# Patient Record
Sex: MALE | Race: WHITE | Employment: FULL TIME | URBAN - METROPOLITAN AREA
[De-identification: names, ages, dates, MRNs, and addresses within clinical notes are randomized per-mention and may not be internally consistent; named-entity substitution may affect disease eponyms.]

---

## 2017-01-06 ENCOUNTER — APPOINTMENT (OUTPATIENT)
Dept: LAB | Facility: HOSPITAL | Age: 42
End: 2017-01-06
Attending: INTERNAL MEDICINE
Payer: COMMERCIAL

## 2017-01-06 ENCOUNTER — OFFICE VISIT (OUTPATIENT)
Dept: INTERNAL MEDICINE CLINIC | Facility: CLINIC | Age: 42
End: 2017-01-06

## 2017-01-06 VITALS
SYSTOLIC BLOOD PRESSURE: 128 MMHG | BODY MASS INDEX: 29.68 KG/M2 | WEIGHT: 212 LBS | HEIGHT: 71 IN | DIASTOLIC BLOOD PRESSURE: 88 MMHG | HEART RATE: 92 BPM

## 2017-01-06 DIAGNOSIS — R19.7 DIARRHEA, UNSPECIFIED TYPE: ICD-10-CM

## 2017-01-06 DIAGNOSIS — Z00.00 ANNUAL PHYSICAL EXAM: ICD-10-CM

## 2017-01-06 DIAGNOSIS — Z00.00 ANNUAL PHYSICAL EXAM: Primary | ICD-10-CM

## 2017-01-06 PROBLEM — E78.5 DYSLIPIDEMIA: Status: ACTIVE | Noted: 2017-01-06

## 2017-01-06 LAB
ALBUMIN SERPL BCP-MCNC: 4.3 G/DL (ref 3.5–4.8)
ALBUMIN/GLOB SERPL: 1.4 {RATIO} (ref 1–2)
ALP SERPL-CCNC: 57 U/L (ref 32–100)
ALT SERPL-CCNC: 36 U/L (ref 17–63)
ANION GAP SERPL CALC-SCNC: 7 MMOL/L (ref 0–18)
AST SERPL-CCNC: 27 U/L (ref 15–41)
BILIRUB SERPL-MCNC: 1.5 MG/DL (ref 0.3–1.2)
BUN SERPL-MCNC: 10 MG/DL (ref 8–20)
BUN/CREAT SERPL: 11.6 (ref 10–20)
CALCIUM SERPL-MCNC: 9.1 MG/DL (ref 8.5–10.5)
CHLORIDE SERPL-SCNC: 106 MMOL/L (ref 95–110)
CHOLEST SERPL-MCNC: 185 MG/DL (ref 110–200)
CO2 SERPL-SCNC: 28 MMOL/L (ref 22–32)
CREAT SERPL-MCNC: 0.86 MG/DL (ref 0.5–1.5)
ERYTHROCYTE [DISTWIDTH] IN BLOOD BY AUTOMATED COUNT: 13.3 % (ref 11–15)
GLOBULIN PLAS-MCNC: 3 G/DL (ref 2.5–3.7)
GLUCOSE SERPL-MCNC: 92 MG/DL (ref 70–99)
HCT VFR BLD AUTO: 47.7 % (ref 41–52)
HDLC SERPL-MCNC: 38 MG/DL
HGB BLD-MCNC: 15.7 G/DL (ref 13.5–17.5)
LDLC SERPL CALC-MCNC: 128 MG/DL (ref 0–99)
MCH RBC QN AUTO: 29.4 PG (ref 27–32)
MCHC RBC AUTO-ENTMCNC: 32.9 G/DL (ref 32–37)
MCV RBC AUTO: 89.6 FL (ref 80–100)
NONHDLC SERPL-MCNC: 147 MG/DL
OSMOLALITY UR CALC.SUM OF ELEC: 291 MOSM/KG (ref 275–295)
PLATELET # BLD AUTO: 194 K/UL (ref 140–400)
PMV BLD AUTO: 10.6 FL (ref 7.4–10.3)
POTASSIUM SERPL-SCNC: 4.1 MMOL/L (ref 3.3–5.1)
PROT SERPL-MCNC: 7.3 G/DL (ref 5.9–8.4)
RBC # BLD AUTO: 5.32 M/UL (ref 4.5–5.9)
SODIUM SERPL-SCNC: 141 MMOL/L (ref 136–144)
TRIGL SERPL-MCNC: 97 MG/DL (ref 1–149)
TSH SERPL-ACNC: 1.59 UIU/ML (ref 0.34–5.6)
WBC # BLD AUTO: 7.2 K/UL (ref 4–11)

## 2017-01-06 PROCEDURE — 36415 COLL VENOUS BLD VENIPUNCTURE: CPT

## 2017-01-06 PROCEDURE — 99386 PREV VISIT NEW AGE 40-64: CPT | Performed by: INTERNAL MEDICINE

## 2017-01-06 PROCEDURE — 90471 IMMUNIZATION ADMIN: CPT | Performed by: INTERNAL MEDICINE

## 2017-01-06 PROCEDURE — 84443 ASSAY THYROID STIM HORMONE: CPT

## 2017-01-06 PROCEDURE — 80053 COMPREHEN METABOLIC PANEL: CPT

## 2017-01-06 PROCEDURE — 85027 COMPLETE CBC AUTOMATED: CPT

## 2017-01-06 PROCEDURE — 80061 LIPID PANEL: CPT

## 2017-01-06 PROCEDURE — 90715 TDAP VACCINE 7 YRS/> IM: CPT | Performed by: INTERNAL MEDICINE

## 2017-01-06 NOTE — PATIENT INSTRUCTIONS
You received a Tdap vaccine today, good for 10 years. Await results of blood and stool testing. If testing normal, would recommend follow-up appointment with Dr. Kayla Grier. Please try to eat healthy, exercise regularly, and lose some weight.

## 2017-01-06 NOTE — H&P
Beatriz Curry is a 37year old male who presents for a complete physical exam.   HPI:   His last physical was April 2013. He is concerned about persistent GI issues.   Since last fall, he has persistent and now constant mid abdominal discomfort and loos Social History:    Smoking Status: Never Smoker                      Alcohol Use: Yes           2.0 oz/week       4 Glasses of wine per week       Comment: Occasional glass of wine          REVIEW OF SYSTEMS:   GENERAL: No fever  LUNGS: No cough wheezing culture and O&P exam.  Order sent. If above testing normal, would recommend follow-up visit with Dr. Carl Phan of GI.  - Ova and Parasites (non-traveler); Future  - Stool Culture;  Future      Min Harrison MD  1/6/2017  9:42 AM

## 2017-01-09 ENCOUNTER — LAB ENCOUNTER (OUTPATIENT)
Dept: LAB | Facility: HOSPITAL | Age: 42
End: 2017-01-09
Attending: INTERNAL MEDICINE
Payer: COMMERCIAL

## 2017-01-09 DIAGNOSIS — R19.7 DIARRHEA, UNSPECIFIED TYPE: ICD-10-CM

## 2017-01-09 LAB
CRYPTOSP AG STL QL IA: NEGATIVE
G LAMBLIA AG STL QL IA: NEGATIVE

## 2017-01-09 PROCEDURE — 87046 STOOL CULTR AEROBIC BACT EA: CPT

## 2017-01-09 PROCEDURE — 87045 FECES CULTURE AEROBIC BACT: CPT

## 2017-01-09 PROCEDURE — 87272 CRYPTOSPORIDIUM AG IF: CPT

## 2017-01-09 PROCEDURE — 87015 SPECIMEN INFECT AGNT CONCNTJ: CPT

## 2017-01-09 PROCEDURE — 87335 E COLI 0157 AG IA: CPT

## 2017-01-09 PROCEDURE — 87329 GIARDIA AG IA: CPT

## 2017-01-09 PROCEDURE — 87427 SHIGA-LIKE TOXIN AG IA: CPT

## 2017-03-15 ENCOUNTER — TELEPHONE (OUTPATIENT)
Dept: GASTROENTEROLOGY | Facility: CLINIC | Age: 42
End: 2017-03-15

## 2017-03-20 ENCOUNTER — OFFICE VISIT (OUTPATIENT)
Dept: GASTROENTEROLOGY | Facility: CLINIC | Age: 42
End: 2017-03-20

## 2017-03-20 VITALS
HEART RATE: 83 BPM | DIASTOLIC BLOOD PRESSURE: 84 MMHG | WEIGHT: 211.38 LBS | BODY MASS INDEX: 29.59 KG/M2 | HEIGHT: 71 IN | SYSTOLIC BLOOD PRESSURE: 148 MMHG

## 2017-03-20 DIAGNOSIS — R19.8 IRREGULAR BOWEL HABITS: Primary | ICD-10-CM

## 2017-03-20 PROCEDURE — 99213 OFFICE O/P EST LOW 20 MIN: CPT | Performed by: INTERNAL MEDICINE

## 2017-03-20 PROCEDURE — 99212 OFFICE O/P EST SF 10 MIN: CPT | Performed by: INTERNAL MEDICINE

## 2017-03-20 RX ORDER — DICYCLOMINE HYDROCHLORIDE 10 MG/1
10 CAPSULE ORAL 3 TIMES DAILY
Qty: 90 CAPSULE | Refills: 0 | Status: SHIPPED | OUTPATIENT
Start: 2017-03-20 | End: 2017-08-23

## 2017-03-20 NOTE — PATIENT INSTRUCTIONS
Post infectious IBS  - FODMAP diet  - c dif testing  - Trial of Bentyl as needed   - Florastar 250mg 1 pill twice a day for 1 month

## 2017-03-22 NOTE — PROGRESS NOTES
Henry Wills is a 40year old male. HPI:   Patient presents with:  Abdomen/Flank Pain (GI/): since September.  Gas and pt states when he has urge for BM he has to go immediately       The patient is a 21-year-old male with a history of prior [place] lb 6.4 oz (95.89 kg).     The patient appears their stated age and is in no acute distress  HEENT- anicteric sclera, neck no lymphadnopathy, OP- clear with no masses or lesions  Chest- Clear bilaterally, no wheezing,  Heart- regular rate, no murmur or del castillo

## 2017-03-25 ENCOUNTER — APPOINTMENT (OUTPATIENT)
Dept: LAB | Facility: HOSPITAL | Age: 42
End: 2017-03-25
Attending: INTERNAL MEDICINE
Payer: COMMERCIAL

## 2017-03-25 DIAGNOSIS — R19.8 IRREGULAR BOWEL HABITS: ICD-10-CM

## 2017-03-25 LAB — C DIFF TOX B STL QL: NEGATIVE

## 2017-03-25 PROCEDURE — 87493 C DIFF AMPLIFIED PROBE: CPT

## 2017-03-29 ENCOUNTER — TELEPHONE (OUTPATIENT)
Dept: GASTROENTEROLOGY | Facility: CLINIC | Age: 42
End: 2017-03-29

## 2017-03-29 NOTE — TELEPHONE ENCOUNTER
Nursing: Reviewing Dr. Mary Jane Jean lab results in his absence. Please let the patient know the C. difficile panel came back negative. Please instruct him to continue per Dr. Mary Jane Jean previous instruction.     Dr. Maggie Schrader can indicate any further testing/treatm

## 2017-04-05 ENCOUNTER — TELEPHONE (OUTPATIENT)
Dept: GASTROENTEROLOGY | Facility: CLINIC | Age: 42
End: 2017-04-05

## 2017-04-05 NOTE — TELEPHONE ENCOUNTER
----- Message from Abdoulaye Baker MD sent at 4/5/2017  8:34 AM CDT -----  RN to inform that the stool test was negative.    Please see how he is doing on the regimen

## 2017-04-05 NOTE — TELEPHONE ENCOUNTER
Pt given msg below and vrb understanding. We also reviewed reasoning for FODMAP diet and the function of Bentyl. Update: Pt was on vacation and just started on FODMAP diet now. Not enough information yet.    Pt has been taking probiotic BID, metamucil QD

## 2017-08-23 RX ORDER — DICYCLOMINE HYDROCHLORIDE 10 MG/1
10 CAPSULE ORAL 3 TIMES DAILY
Qty: 90 CAPSULE | Refills: 0 | Status: SHIPPED | OUTPATIENT
Start: 2017-08-23 | End: 2018-01-23

## 2017-08-23 NOTE — TELEPHONE ENCOUNTER
Pt is requesting a refill of Rx Dicyclomine HCl 10 MG Oral Cap. Thank You         Current Outpatient Prescriptions:   •  Dicyclomine HCl 10 MG Oral Cap, Take 1 capsule (10 mg total) by mouth 3 (three) times daily. , Disp: 90 capsule, Rfl: 0

## 2018-01-23 RX ORDER — DICYCLOMINE HYDROCHLORIDE 10 MG/1
CAPSULE ORAL
Qty: 90 CAPSULE | Refills: 0 | Status: SHIPPED | OUTPATIENT
Start: 2018-01-23 | End: 2019-03-08

## 2018-01-23 NOTE — TELEPHONE ENCOUNTER
Pending Prescriptions Disp Refills    DICYCLOMINE HCL 10 MG Oral Cap [Pharmacy Med Name: DICYCLOMINE 10MG CAPSULES] 90 capsule 0     Sig: TAKE 1 CAPSULE(10 MG) BY MOUTH THREE TIMES DAILY         See refill request  Patient last seen 3-20-17.    Medication

## 2018-07-12 ENCOUNTER — OFFICE VISIT (OUTPATIENT)
Dept: FAMILY MEDICINE CLINIC | Facility: CLINIC | Age: 43
End: 2018-07-12

## 2018-07-12 ENCOUNTER — APPOINTMENT (OUTPATIENT)
Dept: LAB | Facility: HOSPITAL | Age: 43
End: 2018-07-12
Attending: FAMILY MEDICINE
Payer: COMMERCIAL

## 2018-07-12 VITALS
WEIGHT: 210 LBS | RESPIRATION RATE: 97 BRPM | SYSTOLIC BLOOD PRESSURE: 138 MMHG | BODY MASS INDEX: 29.4 KG/M2 | DIASTOLIC BLOOD PRESSURE: 92 MMHG | HEIGHT: 71 IN | HEART RATE: 75 BPM

## 2018-07-12 DIAGNOSIS — K58.9 IRRITABLE BOWEL SYNDROME, UNSPECIFIED TYPE: Primary | ICD-10-CM

## 2018-07-12 DIAGNOSIS — E66.3 OVERWEIGHT (BMI 25.0-29.9): ICD-10-CM

## 2018-07-12 DIAGNOSIS — K58.9 IRRITABLE BOWEL SYNDROME, UNSPECIFIED TYPE: ICD-10-CM

## 2018-07-12 DIAGNOSIS — Z01.89 ENCOUNTER FOR ROUTINE LABORATORY TESTING: ICD-10-CM

## 2018-07-12 DIAGNOSIS — R03.0 PREHYPERTENSION: ICD-10-CM

## 2018-07-12 LAB
MAGNESIUM SERPL-MCNC: 1.8 MG/DL (ref 1.8–2.5)
TSH SERPL-ACNC: 1.79 UIU/ML (ref 0.45–5.33)
VIT B12 SERPL-MCNC: 198 PG/ML (ref 181–914)

## 2018-07-12 PROCEDURE — 82306 VITAMIN D 25 HYDROXY: CPT

## 2018-07-12 PROCEDURE — 84207 ASSAY OF VITAMIN B-6: CPT

## 2018-07-12 PROCEDURE — 86628 CANDIDA ANTIBODY: CPT

## 2018-07-12 PROCEDURE — 36415 COLL VENOUS BLD VENIPUNCTURE: CPT

## 2018-07-12 PROCEDURE — 83735 ASSAY OF MAGNESIUM: CPT

## 2018-07-12 PROCEDURE — 84443 ASSAY THYROID STIM HORMONE: CPT

## 2018-07-12 PROCEDURE — 99214 OFFICE O/P EST MOD 30 MIN: CPT | Performed by: FAMILY MEDICINE

## 2018-07-12 PROCEDURE — 82607 VITAMIN B-12: CPT

## 2018-07-12 NOTE — PATIENT INSTRUCTIONS
The products and items listed below (the “Products”)  and their claims have not been evaluated by the Food and Drug Administration. Dietary products are not intended to treat, prevent, mitigate or cure disease.  Ultimately, you must draw your own conclusion Practitioner's Last Name: Gin Jameson    Then register with your name, phone and address. Purchase the recommended products and they will be sent directly to your address.     ProbioMax Daily DF    Description: is a vegetarian, dairy- and gluten-free, four-stra Why: GlutAloeMine® features four specialized ingredients for enhanced gastrointestinal support.  This unique formula contains a concentrated extract of licorice that has been processed to remove glycyrrhizin—thus reducing risk of side effects associated wit

## 2018-07-12 NOTE — PROGRESS NOTES
Cele Ashraf is a 44year old male. Patient presents with:  Establish Care      HPI:     Stomach problems since his early 25s. Onions are an intolerance for him, mildred raw onions.   Had acne in high school, was given antibiotics for 4-5 yrs, then went Marital status:   Spouse name: N/A    Years of education: N/A  Number of children: 2     Occupational History         Social History Main Topics   Smoking status: Never Smoker    Smokeless tobacco: Never Used    Alcohol use Yes  2.0 oz/week    4 Mahad Villatoro testing performed today to evaluate for food sensitivities that may relate to GI symptoms and other low level symptoms of inflammation in the body. Evaluate for micronutrient deficiency and marker for dysbiosis.    Counseled on supplement recommenda A liquid supplement for gut health. This is a carbon, soil-based product that helps to rebuild the tight junctions, or important connections between cells, in the intestines.  These tight junctions get compromised by daily stress, reduced immune function an To further support resistance to low pH and the delivery of microorganisms to the small intestines, [employer] employs DRcaps™ gastro-resistant capsules.  These specially designed, innovative capsules help slow exposure of actives to stomach acid  and ensure m The FIT Test yields many benefits, including: uncovering which foods are causing inflammation and disease, developing a personalized nutritional guide, and improving a patient’s state of health and energy levels.      This test is not covered by insurance a

## 2018-07-13 LAB — 25(OH)D3 SERPL-MCNC: 26.8 NG/ML

## 2018-07-17 LAB — VITAMIN B6: 31.1 NMOL/L

## 2018-07-18 LAB
CANDIDA ANTIBODY IGA: 0.77 EV
CANDIDA ANTIBODY IGG: 1.63 EV
CANDIDA ANTIBODY IGM: 0.11 EV

## 2018-08-22 ENCOUNTER — OFFICE VISIT (OUTPATIENT)
Dept: FAMILY MEDICINE CLINIC | Facility: CLINIC | Age: 43
End: 2018-08-22
Payer: COMMERCIAL

## 2018-08-22 VITALS
WEIGHT: 214 LBS | BODY MASS INDEX: 29.96 KG/M2 | HEIGHT: 71 IN | DIASTOLIC BLOOD PRESSURE: 70 MMHG | OXYGEN SATURATION: 99 % | SYSTOLIC BLOOD PRESSURE: 120 MMHG | HEART RATE: 80 BPM

## 2018-08-22 DIAGNOSIS — R79.89 LOW VITAMIN D LEVEL: Primary | ICD-10-CM

## 2018-08-22 DIAGNOSIS — R79.0 LOW MAGNESIUM LEVEL: ICD-10-CM

## 2018-08-22 DIAGNOSIS — K58.9 IRRITABLE BOWEL SYNDROME, UNSPECIFIED TYPE: ICD-10-CM

## 2018-08-22 DIAGNOSIS — E53.9 VITAMIN B DEFICIENCY: ICD-10-CM

## 2018-08-22 DIAGNOSIS — B37.9 CANDIDIASIS: ICD-10-CM

## 2018-08-22 DIAGNOSIS — E66.3 OVERWEIGHT (BMI 25.0-29.9): ICD-10-CM

## 2018-08-22 DIAGNOSIS — T78.1XXA FOOD SENSITIVITY WITH GASTROINTESTINAL SYMPTOMS: ICD-10-CM

## 2018-08-22 PROCEDURE — 99214 OFFICE O/P EST MOD 30 MIN: CPT | Performed by: FAMILY MEDICINE

## 2018-08-22 NOTE — PATIENT INSTRUCTIONS
The products and items listed below (the “Products”)  and their claims have not been evaluated by the Food and Drug Administration. Dietary products are not intended to treat, prevent, mitigate or cure disease.  Ultimately, you must draw your own conclusion come with a guarantee that they are gluten-free.  When non-gluten grains are processed for human consumption (e.g., milling whole oats and preparing rice for packaging), their physical structure changes, and this increases the risk of an inflammatory reacti were found to be elevated in your system. Candida is a yeast, or a type of microorganism, that is found in the whole mix of bacteria and yeast in and around your body.  It is normal in small amounts and your immune system works to keep levels of it in luz elena blend of herbs, essential oils,  and sodium caprylate, a naturally occurring fatty acid. Candicidal is  formulated to support the body’s immune system as well as a healthy  gastrointestinal (GI) carlos.  This comprehensive formula contains  Origanox™ WS—a GR FRUITS  Bananas  Dates  Fruit juices  Grapes  Waterman  Raisins  GLUTINOUS GRAINS  Barley  [place]incoln  Spelt  Wheat  TOXIC MEATS & FISH  Pork  Processed meats  Shellfish  Swordfish  Tuna  SOME DAIRY PRODUCTS  Cheese  Milk  Cream  Whey isolate  MOLDY NUTS & SEEDS  Pe Avocados and olives are also fruits, although many people don’t realize this. These are all very nutritious fruits that contain very few natural sugars.  When buying olives, be sure to buy the properly fermented kind, not the ones that are simply pickled i hours. Coconut is always a great option on the Candida diet, whether is comes in the form of oil, coconut milk, or shredded coconut. And you can also use healthy seeds like flax, latonia, and sunflower.  For baking, almond flour and coconut flour are healthy o prebiotic (it contains 20% Inulin), so it can help to repopulate your gut with healthy bacteria too. Chicory root coffee is a good option if you are missing that bitter coffee taste.     FOODS TO EAT IN MODERATION    Vegetables  These include the more starc food source for Candida, even though the sugars are natural sugars. Most, if not all, fruits should be avoided in the early stages of the diet. See the foods to eat page for some low sugar fruits that you can eat.   It’s particularly important to avoid amrita meats that you should definitely avoid. Pork contains retroviruses and parasites that may survive cooking and be harmful for those with a weakened digestive system.  Also remember that pork often comes in an over-cooked form (i.e. tripp!) that is full of c irritate your gut. Candida sufferers also tend to have a higher sensitivity to mold, which can lead to inflammation and an immune reaction (just like mold in your home).   The nuts that are highest in mold are usually peanuts, cashews, pecans, walnuts, and names for sugar that can appear on your ingredients label. Typical culprits tend to be condiments, breakfast cereals, and soft drinks. Always read food labels to make sure your food doesn’t contain sugar.  And remember that natural sugars like maple syrup, negatively affect your immune system.     Resources    • [email or web address].it  • Candida Free Cookbook and Action Plan by [facility] Sender    Snack ideas  • Flax or seed crackers (Alysha’s Gone Crackers)  • Tara (bison, grass-fed beef or turkey—try Kr

## 2018-08-22 NOTE — PROGRESS NOTES
Hermilo Iyer is a 44year old male. Patient presents with: Integrative Medicine Consult: follow up      HPI:     Feeling slightly better, but about the same with symptoms.    Taking Restore and probiotic, as well as GlutAloeMine  Not sure it is making Seat Belt Yes    Special Diet No    Stress Concern Yes    Weight Concern Yes     Social History Narrative   None on file       SURGICAL HISTORY:     Past Surgical History:   Procedure Laterality Date   • Colonoscopy  2015       PHYSICAL EXAM:      08/22/1 The products and items listed below (the “Products”)  and their claims have not been evaluated by the Food and Drug Administration. Dietary products are not intended to treat, prevent, mitigate or cure disease.  Ultimately, you must draw your own conclusion ? A note about oats: although oats do not naturally contain gluten, they are frequently contaminated with gluten because they are processed at mills that also handle wheat; avoid them unless they come with a guarantee that they are gluten-free.  When non-gl · Supports Cardiovascular Health by Affecting Arterial Calcium Deposits*  · Supports Healthy Blood Clotting*       Directions: Take one capsule daily, preferably at mealtime         The Candida antibody levels were found to be elevated in your system.    Ca 5. The ultimate goal is to use this diet framework to create a long-term lifestyle for you to follow that will support and maintain optimal health.  It is important that you feel good, enjoy life and food and don't feel like you are being imprisoned by any Blueberries  Grapefruit  Oranges  Peach  Pears  Strawberries  Watermelon  GRAINS & PSEUDO-GRAINS  Amaranth  Black rice  Brown rice  Wild rice  RED MEATS  Beef  Lamb  Venison  NUTS  Nut milks  Nut butters  Walnuts  Pecans  FERMENTED FOODS  Rai [place] Minimize starchy vegetables such as sweet potatoes, potatoes, yams, corn, winter squash, beets, peas, parsnips, and beans, especially in the early part of your treatment.  When you switch to a low sugar diet, there is often a temptation to eat lots of starc The best dairy products to eat are the fermented ones like yogurt and kefir. Live probiotic cultures help your gut to repopulate with good bacteria.  The live bacteria in the yogurt will crowd out the Candida yeast and restore balance to your digestive syst Stevia, erythritol, and xylitol can be used in place of sugar, but they have a much smaller effect on your blood sugar levels. You generally need only a little of these sweeteners, as they are much sweeter tasting than sugar.  These sweeteners are particula Nut milks and butters tend to be more problematic than the nuts themselves. Making your own is the best way, but if you can’t do that then make sure that you buy a reputable organic brand.   Fermented Foods  Foods like kefir and sauerkraut tend to be most b Gluten is a very common trigger for food sensitivities, and often results in symptoms like bloating, indigestion, cramping, brain fog, and fatigue. If you already have a Candida problem, eating gluten is likely to exacerbate your symptoms.  It also likely t Most dairy should be avoided except ghee, butter, kefir, and probiotic yogurt.  Dairy foods tend to contain lots of natural sugars (e.g. lactose), and they can also be difficult to digest. Many people have latent sensitivities to dairy products (especially Many condiments and salad dressings tend to contain large amounts of hidden sugars, which can exacerbate your Candida overgrowth. Products like salad dressings might be marketed as a healthy choice, when in reality they are the exact opposite.  For an alter There are healthier sweetener choices that won’t raise your blood sugar or cause long term health problems. Try sweeteners like stevia, erythritol, or xylitol instead.   Non-Alcoholic Drinks  Caffeine can cause your blood sugar to rise, but the main problem ? Whole food or raw food protein bars (Raw Revolution and Juan Luisen 39)  ? Roasted Seaweed  ? Roasted Kale  ? Fruit- in moderation  ?  Raw cut up veggies (with hummus)            Return in about 2 months (around 10/22/2018) for Integrative Medicine - Established

## 2019-01-12 ENCOUNTER — HOSPITAL ENCOUNTER (OUTPATIENT)
Age: 44
Discharge: HOME | End: 2019-01-12
Attending: EMERGENCY MEDICINE
Payer: COMMERCIAL

## 2019-01-12 VITALS
HEIGHT: 71 IN | DIASTOLIC BLOOD PRESSURE: 90 MMHG | SYSTOLIC BLOOD PRESSURE: 128 MMHG | TEMPERATURE: 99 F | WEIGHT: 210 LBS | BODY MASS INDEX: 29.4 KG/M2 | HEART RATE: 87 BPM | RESPIRATION RATE: 18 BRPM | OXYGEN SATURATION: 96 %

## 2019-01-12 DIAGNOSIS — J02.9 VIRAL PHARYNGITIS: Primary | ICD-10-CM

## 2019-01-12 LAB — S PYO AG THROAT QL: NEGATIVE

## 2019-01-12 PROCEDURE — 99212 OFFICE O/P EST SF 10 MIN: CPT

## 2019-01-12 PROCEDURE — 87430 STREP A AG IA: CPT

## 2019-01-12 NOTE — ED PROVIDER NOTES
Patient Seen in: [address]    History   Patient presents with:  Sore Throat    Stated Complaint: sore throat    HPI    The patient is a 43-year-old male with no significant past medical history presents now with sore th auscultation, no tenderness  Cardiovascular: Regular rate and rhythm without murmur  Abdomen: Soft, nontender and nondistended  Neurologic: Patient is awake, alert and oriented ×3.   The patient's motor strength is 5 out of 5 and symmetric in the upper and

## 2019-03-11 NOTE — TELEPHONE ENCOUNTER
Requested Prescriptions     Pending Prescriptions Disp Refills   • Dicyclomine HCl 10 MG Oral Cap 90 capsule 0     LOV-3/20/17  LR-1/23/18

## 2019-03-12 RX ORDER — DICYCLOMINE HYDROCHLORIDE 10 MG/1
10 CAPSULE ORAL 3 TIMES DAILY PRN
Qty: 90 CAPSULE | Refills: 0 | Status: SHIPPED | OUTPATIENT
Start: 2019-03-12 | End: 2021-04-22

## 2019-03-13 RX ORDER — DICYCLOMINE HYDROCHLORIDE 10 MG/1
CAPSULE ORAL
Qty: 90 CAPSULE | Refills: 0 | OUTPATIENT
Start: 2019-03-13

## 2019-06-04 ENCOUNTER — TELEPHONE (OUTPATIENT)
Dept: GASTROENTEROLOGY | Facility: CLINIC | Age: 44
End: 2019-06-04

## 2019-06-04 ENCOUNTER — LAB ENCOUNTER (OUTPATIENT)
Dept: LAB | Facility: HOSPITAL | Age: 44
End: 2019-06-04
Attending: INTERNAL MEDICINE
Payer: COMMERCIAL

## 2019-06-04 ENCOUNTER — OFFICE VISIT (OUTPATIENT)
Dept: GASTROENTEROLOGY | Facility: CLINIC | Age: 44
End: 2019-06-04
Payer: COMMERCIAL

## 2019-06-04 VITALS
SYSTOLIC BLOOD PRESSURE: 153 MMHG | BODY MASS INDEX: 30.1 KG/M2 | WEIGHT: 215 LBS | DIASTOLIC BLOOD PRESSURE: 97 MMHG | HEART RATE: 72 BPM | HEIGHT: 71 IN

## 2019-06-04 DIAGNOSIS — R19.7 DIARRHEA, UNSPECIFIED TYPE: Primary | ICD-10-CM

## 2019-06-04 DIAGNOSIS — R19.7 DIARRHEA, UNSPECIFIED TYPE: ICD-10-CM

## 2019-06-04 PROCEDURE — 36415 COLL VENOUS BLD VENIPUNCTURE: CPT

## 2019-06-04 PROCEDURE — 80053 COMPREHEN METABOLIC PANEL: CPT

## 2019-06-04 PROCEDURE — 85652 RBC SED RATE AUTOMATED: CPT

## 2019-06-04 PROCEDURE — 86140 C-REACTIVE PROTEIN: CPT

## 2019-06-04 PROCEDURE — 85025 COMPLETE CBC W/AUTO DIFF WBC: CPT

## 2019-06-04 PROCEDURE — 99213 OFFICE O/P EST LOW 20 MIN: CPT | Performed by: INTERNAL MEDICINE

## 2019-06-04 RX ORDER — HYOSCYAMINE SULFATE 0.125 MG
0.12 TABLET ORAL EVERY 6 HOURS PRN
Qty: 30 TABLET | Refills: 0 | Status: SHIPPED | OUTPATIENT
Start: 2019-06-04 | End: 2020-10-02 | Stop reason: ALTCHOICE

## 2019-06-04 NOTE — TELEPHONE ENCOUNTER
Pt transferred from answering service, pt wanting to know where his 4:15pm appt is today. Informed at [facility] office, gave directions, pt states will be there by 4:10--I notified  at [facility].

## 2019-06-04 NOTE — PATIENT INSTRUCTIONS
IBS symptoms  - blood work and stool testing  - trial of IBguard ( can buy at the pharmacy without a script)  - Levsin as needed ( instead of the Bentyl)  - consider SIBO /bacterial overgrowth

## 2019-06-05 ENCOUNTER — APPOINTMENT (OUTPATIENT)
Dept: LAB | Facility: HOSPITAL | Age: 44
End: 2019-06-05
Attending: INTERNAL MEDICINE
Payer: COMMERCIAL

## 2019-06-05 DIAGNOSIS — R19.7 DIARRHEA, UNSPECIFIED TYPE: ICD-10-CM

## 2019-06-05 PROCEDURE — 87493 C DIFF AMPLIFIED PROBE: CPT

## 2019-06-05 PROCEDURE — 83993 ASSAY FOR CALPROTECTIN FECAL: CPT

## 2019-06-25 ENCOUNTER — OFFICE VISIT (OUTPATIENT)
Dept: GASTROENTEROLOGY | Facility: CLINIC | Age: 44
End: 2019-06-25
Payer: COMMERCIAL

## 2019-06-25 VITALS
DIASTOLIC BLOOD PRESSURE: 95 MMHG | BODY MASS INDEX: 30.41 KG/M2 | WEIGHT: 217.19 LBS | SYSTOLIC BLOOD PRESSURE: 130 MMHG | HEART RATE: 72 BPM | HEIGHT: 71 IN

## 2019-06-25 DIAGNOSIS — K58.0 IRRITABLE BOWEL SYNDROME WITH DIARRHEA: ICD-10-CM

## 2019-06-25 DIAGNOSIS — R19.7 DIARRHEA, UNSPECIFIED TYPE: Primary | ICD-10-CM

## 2019-06-25 PROCEDURE — 99213 OFFICE O/P EST LOW 20 MIN: CPT | Performed by: INTERNAL MEDICINE

## 2019-06-25 NOTE — PROGRESS NOTES
Jarett Jain is a 38year old male. HPI:   Patient presents with: Follow - Up    The patient is a 81-year-old male who has a history of irritable bowel syndrome who follows up in the office today. He was seen about a month ago.   His symptoms are l tablet (0.125 mg total) by mouth every 6 (six) hours as needed for Cramping. Disp: 30 tablet Rfl: 0   Dicyclomine HCl 10 MG Oral Cap Take 1 capsule (10 mg total) by mouth 3 (three) times daily as needed.  Disp: 90 capsule Rfl: 0   Ibuprofen (ADVIL) 200 MG O Oral Tab 42 tablet 0     Sig: Take 1 tablet (550 mg total) by mouth 3 (three) times daily. Imaging & Referrals:  None     6/25/2019  Yasmani Stafford.  Raffi Noyola MD

## 2019-06-25 NOTE — PATIENT INSTRUCTIONS
Abdominal pain/irregular bowel habits  - trial of Xifaxan for 2 weeks  - dietary changes  - Florastor x 1 month ( taper in when on the antibiotic)  - follow up in 6- 8 weeks

## 2019-08-16 ENCOUNTER — OFFICE VISIT (OUTPATIENT)
Dept: AUDIOLOGY | Facility: CLINIC | Age: 44
End: 2019-08-16
Payer: COMMERCIAL

## 2019-08-16 ENCOUNTER — OFFICE VISIT (OUTPATIENT)
Dept: OTOLARYNGOLOGY | Facility: CLINIC | Age: 44
End: 2019-08-16
Payer: COMMERCIAL

## 2019-08-16 VITALS — HEART RATE: 81 BPM | SYSTOLIC BLOOD PRESSURE: 142 MMHG | TEMPERATURE: 98 F | DIASTOLIC BLOOD PRESSURE: 93 MMHG

## 2019-08-16 DIAGNOSIS — H91.90 HEARING LOSS, UNSPECIFIED HEARING LOSS TYPE, UNSPECIFIED LATERALITY: Primary | ICD-10-CM

## 2019-08-16 DIAGNOSIS — H90.3 SENSORINEURAL HEARING LOSS, BILATERAL: Primary | ICD-10-CM

## 2019-08-16 DIAGNOSIS — H04.303 BILATERAL DACRYOCYSTITIS: ICD-10-CM

## 2019-08-16 PROCEDURE — 99203 OFFICE O/P NEW LOW 30 MIN: CPT | Performed by: OTOLARYNGOLOGY

## 2019-08-16 PROCEDURE — 92557 COMPREHENSIVE HEARING TEST: CPT | Performed by: AUDIOLOGIST

## 2019-08-16 PROCEDURE — 92567 TYMPANOMETRY: CPT | Performed by: AUDIOLOGIST

## 2019-08-16 RX ORDER — FLUTICASONE PROPIONATE 50 MCG
1 SPRAY, SUSPENSION (ML) NASAL 2 TIMES DAILY
Qty: 1 BOTTLE | Refills: 3 | Status: SHIPPED | OUTPATIENT
Start: 2019-08-16 | End: 2020-10-02 | Stop reason: ALTCHOICE

## 2019-08-16 NOTE — PROGRESS NOTES
AUDIOLOGY REPORT      Avtar Zhang is a 38year old male     Referring Provider: Mallory Murillo   YOB: 1975  Medical Record: [id number]      Patient Hearing History:  Patient reported that both his mother and father have hearing aids.    He

## 2019-08-16 NOTE — PROGRESS NOTES
Mel Ayala is a 38year old male. Patient presents with:  Hearing Loss  Blocked Tear Duct: told by optomologist that sinuses are caused blod      HISTORY OF PRESENT ILLNESS   8/16/2019    The patient presents with chronic epiphora.   He is seen an op (irritable bowel syndrome) 2016       Past Surgical History:   Procedure Laterality Date   • COLONOSCOPY  2015         REVIEW OF SYSTEMS    System Neg/Pos Details   Constitutional Negative Fatigue, fever and weight loss.    ENMT Negative Headaches and neck congestion. Current Outpatient Medications:   •  Fluticasone Propionate 50 MCG/ACT Nasal Suspension, 1 spray by Nasal route 2 (two) times daily. , Disp: 1 Bottle, Rfl: 3  •  Ibuprofen (ADVIL) 200 MG Oral Cap, Take 200 mg by mouth as needed. , Disp: ,

## 2019-08-23 ENCOUNTER — HOSPITAL ENCOUNTER (OUTPATIENT)
Dept: CT IMAGING | Facility: HOSPITAL | Age: 44
Discharge: HOME | End: 2019-08-23
Attending: OTOLARYNGOLOGY
Payer: COMMERCIAL

## 2019-08-23 ENCOUNTER — TELEPHONE (OUTPATIENT)
Dept: OTOLARYNGOLOGY | Facility: CLINIC | Age: 44
End: 2019-08-23

## 2019-08-23 DIAGNOSIS — H04.303 BILATERAL DACRYOCYSTITIS: ICD-10-CM

## 2019-08-23 PROCEDURE — 70486 CT MAXILLOFACIAL W/O DYE: CPT | Performed by: OTOLARYNGOLOGY

## 2019-08-23 NOTE — TELEPHONE ENCOUNTER
Pt states he received a call in regards to his CT results and was instructed to call the office back.

## 2019-08-28 ENCOUNTER — HOSPITAL ENCOUNTER (OUTPATIENT)
Age: 44
Discharge: HOME | End: 2019-08-28
Attending: EMERGENCY MEDICINE
Payer: COMMERCIAL

## 2019-08-28 VITALS
WEIGHT: 210 LBS | DIASTOLIC BLOOD PRESSURE: 99 MMHG | RESPIRATION RATE: 18 BRPM | TEMPERATURE: 98 F | HEART RATE: 103 BPM | HEIGHT: 71 IN | SYSTOLIC BLOOD PRESSURE: 134 MMHG | OXYGEN SATURATION: 99 % | BODY MASS INDEX: 29.4 KG/M2

## 2019-08-28 DIAGNOSIS — J01.40 ACUTE NON-RECURRENT PANSINUSITIS: Primary | ICD-10-CM

## 2019-08-28 DIAGNOSIS — I10 ESSENTIAL HYPERTENSION: ICD-10-CM

## 2019-08-28 LAB — S PYO AG THROAT QL: NEGATIVE

## 2019-08-28 PROCEDURE — 99214 OFFICE O/P EST MOD 30 MIN: CPT

## 2019-08-28 PROCEDURE — 87430 STREP A AG IA: CPT

## 2019-08-28 PROCEDURE — 99213 OFFICE O/P EST LOW 20 MIN: CPT

## 2019-08-28 RX ORDER — AMOXICILLIN 875 MG/1
875 TABLET, COATED ORAL 2 TIMES DAILY
Qty: 20 TABLET | Refills: 0 | Status: SHIPPED | OUTPATIENT
Start: 2019-08-28 | End: 2019-09-07

## 2019-08-28 NOTE — ED PROVIDER NOTES
Patient Seen in: [address]    History   No chief complaint on file.     Stated Complaint: congestion    HPI    Patient is a 43-year-old male with past history of hyperlipidemia who presents now with 1 week history of emily 29.29 kg/m²         Physical Exam    Constitutional: Well-developed well-nourished in no acute distress  Head: Normocephalic, no swelling or tenderness  Eyes: Nonicteric sclera, no conjunctival injection  ENT: TMs are clear and flat bilaterally.   There is

## 2019-08-28 NOTE — ED INITIAL ASSESSMENT (HPI)
Pt started with cold like symptoms on last Thursday, sneezing, congestion, facial pressure, low grade fevers. Has been taking decongestives, flonase, etc... Also had ct face last week and was told there was a lot of congestion.   Pt is traveling on plane t

## 2019-09-10 ENCOUNTER — OFFICE VISIT (OUTPATIENT)
Dept: GASTROENTEROLOGY | Facility: CLINIC | Age: 44
End: 2019-09-10
Payer: COMMERCIAL

## 2019-09-10 VITALS
WEIGHT: 212 LBS | SYSTOLIC BLOOD PRESSURE: 134 MMHG | DIASTOLIC BLOOD PRESSURE: 89 MMHG | BODY MASS INDEX: 29.68 KG/M2 | HEIGHT: 71 IN | HEART RATE: 84 BPM

## 2019-09-10 DIAGNOSIS — K58.0 IRRITABLE BOWEL SYNDROME WITH DIARRHEA: Primary | ICD-10-CM

## 2019-09-10 PROCEDURE — 99213 OFFICE O/P EST LOW 20 MIN: CPT | Performed by: INTERNAL MEDICINE

## 2019-09-10 NOTE — PATIENT INSTRUCTIONS
Irritable bowel syndrome  - dietary changes  - avoid dairy  - Bentyl as needed  - call if ongoing issues, would consider CT scan abdomen/pelvis

## 2019-09-20 ENCOUNTER — OFFICE VISIT (OUTPATIENT)
Dept: INTERNAL MEDICINE CLINIC | Facility: CLINIC | Age: 44
End: 2019-09-20
Payer: COMMERCIAL

## 2019-09-20 ENCOUNTER — APPOINTMENT (OUTPATIENT)
Dept: LAB | Facility: HOSPITAL | Age: 44
End: 2019-09-20
Attending: INTERNAL MEDICINE
Payer: COMMERCIAL

## 2019-09-20 VITALS
SYSTOLIC BLOOD PRESSURE: 136 MMHG | HEART RATE: 76 BPM | BODY MASS INDEX: 29.37 KG/M2 | DIASTOLIC BLOOD PRESSURE: 86 MMHG | WEIGHT: 209.81 LBS | HEIGHT: 71 IN

## 2019-09-20 DIAGNOSIS — Z00.00 ANNUAL PHYSICAL EXAM: ICD-10-CM

## 2019-09-20 DIAGNOSIS — Z00.00 ANNUAL PHYSICAL EXAM: Primary | ICD-10-CM

## 2019-09-20 LAB
CHOLEST SMN-MCNC: 191 MG/DL (ref ?–200)
HDLC SERPL-MCNC: 49 MG/DL (ref 40–59)
LDLC SERPL CALC-MCNC: 122 MG/DL (ref ?–100)
NONHDLC SERPL-MCNC: 142 MG/DL (ref ?–130)
PATIENT FASTING: YES
TRIGL SERPL-MCNC: 99 MG/DL (ref 30–149)
VLDLC SERPL CALC-MCNC: 20 MG/DL (ref 0–30)

## 2019-09-20 PROCEDURE — 99396 PREV VISIT EST AGE 40-64: CPT | Performed by: INTERNAL MEDICINE

## 2019-09-20 PROCEDURE — 90686 IIV4 VACC NO PRSV 0.5 ML IM: CPT | Performed by: INTERNAL MEDICINE

## 2019-09-20 PROCEDURE — 90471 IMMUNIZATION ADMIN: CPT | Performed by: INTERNAL MEDICINE

## 2019-09-20 PROCEDURE — 36415 COLL VENOUS BLD VENIPUNCTURE: CPT

## 2019-09-20 PROCEDURE — 80061 LIPID PANEL: CPT

## 2019-09-20 NOTE — H&P
Elian Grace is a 38year old male who presents for a complete physical exam.   HPI:   His last physical was January 2017. In general he feels well. He follows periodically with Dr. Elizabeth Love for treatment of IBS.   He expresses some concern about his blo Father    • Hypertension Mother    • Crohn's Disease Paternal Grandmother    • Diabetes Paternal Grandfather    • Hypertension Sister    • Prostate Cancer Paternal Uncle    • Diabetes Paternal Uncle    • Heart Disease Maternal Grandfather          CHF sent.  Discussed and recommended healthy diet including sodium and calorie restriction along with regular exercise and attempts at modest weight loss.   Given borderline BP, annual physical.  - FLULAVAL INFLUENZA VACCINE QUAD PRESERVATIVE FREE 0.5 ML  - LIP

## 2019-11-05 ENCOUNTER — HOSPITAL ENCOUNTER (OUTPATIENT)
Age: 44
Discharge: HOME | End: 2019-11-05
Attending: EMERGENCY MEDICINE
Payer: COMMERCIAL

## 2019-11-05 ENCOUNTER — APPOINTMENT (OUTPATIENT)
Dept: GENERAL RADIOLOGY | Age: 44
End: 2019-11-05
Attending: EMERGENCY MEDICINE
Payer: COMMERCIAL

## 2019-11-05 VITALS
RESPIRATION RATE: 18 BRPM | OXYGEN SATURATION: 98 % | BODY MASS INDEX: 29.12 KG/M2 | SYSTOLIC BLOOD PRESSURE: 120 MMHG | HEIGHT: 71 IN | HEART RATE: 68 BPM | DIASTOLIC BLOOD PRESSURE: 81 MMHG | WEIGHT: 208 LBS | TEMPERATURE: 98 F

## 2019-11-05 DIAGNOSIS — T14.8XXA PUNCTURE WOUND: Primary | ICD-10-CM

## 2019-11-05 DIAGNOSIS — S69.90XA THUMB INJURY, INITIAL ENCOUNTER: ICD-10-CM

## 2019-11-05 PROCEDURE — 99213 OFFICE O/P EST LOW 20 MIN: CPT

## 2019-11-05 PROCEDURE — 99214 OFFICE O/P EST MOD 30 MIN: CPT

## 2019-11-05 PROCEDURE — 73140 X-RAY EXAM OF FINGER(S): CPT | Performed by: EMERGENCY MEDICINE

## 2019-11-05 RX ORDER — CLINDAMYCIN HYDROCHLORIDE 300 MG/1
300 CAPSULE ORAL 3 TIMES DAILY
Qty: 30 CAPSULE | Refills: 0 | Status: SHIPPED | OUTPATIENT
Start: 2019-11-05 | End: 2019-11-15

## 2019-11-05 NOTE — ED PROVIDER NOTES
Patient Seen in: [address]    History   Patient presents with:  Upper Extremity Injury (musculoskeletal)    Stated Complaint: left thumb pain    HPI    HPI: Susan Gill is a 38year old male who presents after an i Comment: Occasional glass of wine    Drug use: No      Review of Systems    Positive for stated complaint: left thumb pain  Other systems are as noted in HPI. Constitutional and vital signs reviewed.       All other systems reviewed and negative except as MD  [address] [facility] [address] [phone]    Schedule an appointment as soon as possible for a visit in 2 days  For re-check    Jerad Higgins MD  [address] [address]  [phone]    Schedule a

## 2019-11-05 NOTE — ED NOTES
Pt discharged home, advised to called hand specialty to make an appointment as soon as possible. Pt agreeable.

## 2020-07-10 ENCOUNTER — TELEPHONE (OUTPATIENT)
Dept: ADMINISTRATIVE | Facility: HOSPITAL | Age: 45
End: 2020-07-10

## 2020-07-10 DIAGNOSIS — Z11.59 ENCOUNTER FOR SCREENING FOR OTHER VIRAL DISEASES: Primary | ICD-10-CM

## 2020-10-02 ENCOUNTER — APPOINTMENT (OUTPATIENT)
Dept: LAB | Facility: HOSPITAL | Age: 45
End: 2020-10-02
Attending: INTERNAL MEDICINE
Payer: COMMERCIAL

## 2020-10-02 ENCOUNTER — OFFICE VISIT (OUTPATIENT)
Dept: INTERNAL MEDICINE CLINIC | Facility: CLINIC | Age: 45
End: 2020-10-02
Payer: COMMERCIAL

## 2020-10-02 VITALS
BODY MASS INDEX: 30.05 KG/M2 | WEIGHT: 214.63 LBS | SYSTOLIC BLOOD PRESSURE: 144 MMHG | DIASTOLIC BLOOD PRESSURE: 96 MMHG | HEART RATE: 83 BPM | HEIGHT: 71 IN

## 2020-10-02 DIAGNOSIS — I10 ESSENTIAL HYPERTENSION: ICD-10-CM

## 2020-10-02 DIAGNOSIS — Z00.00 ANNUAL PHYSICAL EXAM: ICD-10-CM

## 2020-10-02 DIAGNOSIS — Z00.00 ANNUAL PHYSICAL EXAM: Primary | ICD-10-CM

## 2020-10-02 PROCEDURE — 81001 URINALYSIS AUTO W/SCOPE: CPT

## 2020-10-02 PROCEDURE — 93010 ELECTROCARDIOGRAM REPORT: CPT | Performed by: INTERNAL MEDICINE

## 2020-10-02 PROCEDURE — 3080F DIAST BP >= 90 MM HG: CPT | Performed by: INTERNAL MEDICINE

## 2020-10-02 PROCEDURE — 36415 COLL VENOUS BLD VENIPUNCTURE: CPT

## 2020-10-02 PROCEDURE — 99396 PREV VISIT EST AGE 40-64: CPT | Performed by: INTERNAL MEDICINE

## 2020-10-02 PROCEDURE — 85027 COMPLETE CBC AUTOMATED: CPT

## 2020-10-02 PROCEDURE — 90686 IIV4 VACC NO PRSV 0.5 ML IM: CPT | Performed by: INTERNAL MEDICINE

## 2020-10-02 PROCEDURE — 80061 LIPID PANEL: CPT

## 2020-10-02 PROCEDURE — 80053 COMPREHEN METABOLIC PANEL: CPT

## 2020-10-02 PROCEDURE — 93005 ELECTROCARDIOGRAM TRACING: CPT

## 2020-10-02 PROCEDURE — 90471 IMMUNIZATION ADMIN: CPT | Performed by: INTERNAL MEDICINE

## 2020-10-02 PROCEDURE — 3077F SYST BP >= 140 MM HG: CPT | Performed by: INTERNAL MEDICINE

## 2020-10-02 PROCEDURE — 3008F BODY MASS INDEX DOCD: CPT | Performed by: INTERNAL MEDICINE

## 2020-10-02 RX ORDER — LISINOPRIL 10 MG/1
10 TABLET ORAL DAILY
Qty: 30 TABLET | Refills: 3 | Status: SHIPPED | OUTPATIENT
Start: 2020-10-02 | End: 2021-01-13

## 2020-10-02 NOTE — H&P
Yuliana Leone is a 41year old male who presents for a complete physical exam.   HPI:   His last physical was September 2019. He is concerned about his blood pressure.   Recently he has been checking his blood pressure daily and readings have been consi Grandfather          CHF age 72   • Colon Cancer Paternal Aunt       Social History:  Social History    Tobacco Use      Smoking status: Never Smoker      Smokeless tobacco: Never Used    Alcohol use:  Yes      Alcohol/week: 3.3 standard drinks      Typ 0.5 ML  - COMP METABOLIC PANEL (14); Future  - CBC, PLATELET; NO DIFFERENTIAL; Future  - LIPID PANEL; Future  - URINALYSIS WITH CULTURE REFLEX; Future  - EKG 12-LEAD    2. Essential hypertension  Begin lisinopril as above with follow-up visit in 4-6 weeks.

## 2020-10-02 NOTE — PATIENT INSTRUCTIONS
You received a flu shot today. Await results of blood and urine testing and EKG. Begin lisinopril 10 mg daily, watching for lightheadedness and coughing. Return visit in 4-6 weeks.

## 2020-10-30 ENCOUNTER — OFFICE VISIT (OUTPATIENT)
Dept: INTERNAL MEDICINE CLINIC | Facility: CLINIC | Age: 45
End: 2020-10-30
Payer: COMMERCIAL

## 2020-10-30 VITALS
WEIGHT: 216.13 LBS | HEIGHT: 71 IN | HEART RATE: 64 BPM | BODY MASS INDEX: 30.26 KG/M2 | SYSTOLIC BLOOD PRESSURE: 128 MMHG | DIASTOLIC BLOOD PRESSURE: 80 MMHG

## 2020-10-30 DIAGNOSIS — I10 ESSENTIAL HYPERTENSION: Primary | ICD-10-CM

## 2020-10-30 PROCEDURE — 99213 OFFICE O/P EST LOW 20 MIN: CPT | Performed by: INTERNAL MEDICINE

## 2020-10-30 PROCEDURE — 3079F DIAST BP 80-89 MM HG: CPT | Performed by: INTERNAL MEDICINE

## 2020-10-30 PROCEDURE — 3008F BODY MASS INDEX DOCD: CPT | Performed by: INTERNAL MEDICINE

## 2020-10-30 PROCEDURE — 3074F SYST BP LT 130 MM HG: CPT | Performed by: INTERNAL MEDICINE

## 2020-10-30 NOTE — PROGRESS NOTES
Jarett Jain is a 41year old male. Patient presents with:  Hypertension  Abdominal Pain    HPI:   Mr. Aubree Felton presents this morning for follow-up of hypertension. At his physical earlier this month, lisinopril was begun.     Since beginning lisinopril, percussion and clear to auscultation  CARDIAC: Rhythm regular S1 S2 normal without murmur   ABDOMEN: Not distended.   Bowel sounds normal.  Soft with mild bilateral mid and lower abdominal tenderness to palpation without guarding or rebound and without mass

## 2020-10-30 NOTE — PATIENT INSTRUCTIONS
Please continue lisinopril. Monitor GI symptoms and notify me if they persist, and we can then change blood pressure medication. Return visit in 6 months.

## 2020-12-12 ENCOUNTER — HOSPITAL ENCOUNTER (OUTPATIENT)
Age: 45
Discharge: HOME | End: 2020-12-12
Payer: COMMERCIAL

## 2020-12-12 VITALS
SYSTOLIC BLOOD PRESSURE: 118 MMHG | HEIGHT: 71 IN | WEIGHT: 205 LBS | OXYGEN SATURATION: 99 % | RESPIRATION RATE: 16 BRPM | HEART RATE: 107 BPM | DIASTOLIC BLOOD PRESSURE: 78 MMHG | BODY MASS INDEX: 28.7 KG/M2 | TEMPERATURE: 98 F

## 2020-12-12 DIAGNOSIS — J01.11 ACUTE RECURRENT FRONTAL SINUSITIS: Primary | ICD-10-CM

## 2020-12-12 PROCEDURE — 99213 OFFICE O/P EST LOW 20 MIN: CPT | Performed by: NURSE PRACTITIONER

## 2020-12-12 RX ORDER — PREDNISONE 20 MG/1
40 TABLET ORAL DAILY
Qty: 10 TABLET | Refills: 0 | Status: SHIPPED | OUTPATIENT
Start: 2020-12-12 | End: 2020-12-17

## 2020-12-12 RX ORDER — AMOXICILLIN AND CLAVULANATE POTASSIUM 875; 125 MG/1; MG/1
1 TABLET, FILM COATED ORAL 2 TIMES DAILY
Qty: 20 TABLET | Refills: 0 | Status: SHIPPED | OUTPATIENT
Start: 2020-12-12 | End: 2020-12-22

## 2020-12-12 NOTE — ED PROVIDER NOTES
Patient Seen in: [facility]      History   Patient presents with:  Sinus Problem: Sinus infection / headcold. - Entered by patient  Headache    Stated Complaint: Sinus Problem - Sinus infection / headcold.     HPI    Patient presents to the i Triage Vitals [12/12/20 1119]   /78   Pulse 107   Resp 16   Temp 98.2 °F (36.8 °C)   Temp src Temporal   SpO2 99 %   O2 Device None (Room air)       Current:/78   Pulse 107   Temp 98.2 °F (36.8 °C) (Temporal)   Resp 16   Ht 180.3 cm (5' 11\") advised he can continue his nasal spray as needed. Patient advised to return if he develops fevers, persistent headaches, neck pain, shortness of breath, difficulty breathing, or has any new or worsening symptoms. Strict return precautions were given.   P

## 2020-12-12 NOTE — ED INITIAL ASSESSMENT (HPI)
Patient states having a headache for over a week, states having sinus pain/pressure. Patient denies any known exposure to COVID, patient denies chest pain or SOB, denies fever. Patient states he had COVID in November, tested positive 11/8.   Patient state

## 2021-01-13 RX ORDER — LISINOPRIL 10 MG/1
TABLET ORAL
Qty: 30 TABLET | Refills: 3 | Status: SHIPPED | OUTPATIENT
Start: 2021-01-13 | End: 2021-01-20

## 2021-01-20 ENCOUNTER — OFFICE VISIT (OUTPATIENT)
Dept: INTERNAL MEDICINE CLINIC | Facility: CLINIC | Age: 46
End: 2021-01-20
Payer: COMMERCIAL

## 2021-01-20 VITALS
WEIGHT: 211 LBS | HEART RATE: 78 BPM | BODY MASS INDEX: 29.54 KG/M2 | SYSTOLIC BLOOD PRESSURE: 134 MMHG | HEIGHT: 71 IN | DIASTOLIC BLOOD PRESSURE: 76 MMHG

## 2021-01-20 DIAGNOSIS — R51.9 CHRONIC NONINTRACTABLE HEADACHE, UNSPECIFIED HEADACHE TYPE: Primary | ICD-10-CM

## 2021-01-20 DIAGNOSIS — G89.29 CHRONIC NONINTRACTABLE HEADACHE, UNSPECIFIED HEADACHE TYPE: Primary | ICD-10-CM

## 2021-01-20 DIAGNOSIS — I10 ESSENTIAL HYPERTENSION: ICD-10-CM

## 2021-01-20 PROCEDURE — 3075F SYST BP GE 130 - 139MM HG: CPT | Performed by: INTERNAL MEDICINE

## 2021-01-20 PROCEDURE — 99213 OFFICE O/P EST LOW 20 MIN: CPT | Performed by: INTERNAL MEDICINE

## 2021-01-20 PROCEDURE — 3008F BODY MASS INDEX DOCD: CPT | Performed by: INTERNAL MEDICINE

## 2021-01-20 PROCEDURE — 3078F DIAST BP <80 MM HG: CPT | Performed by: INTERNAL MEDICINE

## 2021-01-20 RX ORDER — AMLODIPINE BESYLATE 5 MG/1
5 TABLET ORAL DAILY
Qty: 30 TABLET | Refills: 3 | Status: SHIPPED | OUTPATIENT
Start: 2021-01-20 | End: 2021-04-28

## 2021-01-20 NOTE — PATIENT INSTRUCTIONS
Take Advil as needed for your headache. Stop lisinopril. Begin amlodipine 5 mg daily. Return visit in 1 month.

## 2021-01-20 NOTE — PROGRESS NOTES
Wyatt Cardenas is a 41year old male. Patient presents with: Follow - Up    HPI:   He was diagnosed with COVID-19 infection in early November, presenting with fatigue, chills and nasal congestion.   At that time he did not have a fever, sore throat, coug Smoking status: Never Smoker      Smokeless tobacco: Never Used    Alcohol use:  Yes      Alcohol/week: 3.3 standard drinks      Types: 4 Glasses of wine per week      Comment: Occasional glass of wine    Drug use: No       EXAM:   GENERAL: Pleasant male ap

## 2021-01-26 ENCOUNTER — OFFICE VISIT (OUTPATIENT)
Dept: INTERNAL MEDICINE CLINIC | Facility: CLINIC | Age: 46
End: 2021-01-26
Payer: COMMERCIAL

## 2021-01-26 ENCOUNTER — LAB ENCOUNTER (OUTPATIENT)
Dept: LAB | Age: 46
End: 2021-01-26
Attending: INTERNAL MEDICINE
Payer: COMMERCIAL

## 2021-01-26 ENCOUNTER — TELEPHONE (OUTPATIENT)
Dept: INTERNAL MEDICINE CLINIC | Facility: CLINIC | Age: 46
End: 2021-01-26

## 2021-01-26 VITALS
OXYGEN SATURATION: 97 % | HEIGHT: 71 IN | TEMPERATURE: 99 F | SYSTOLIC BLOOD PRESSURE: 118 MMHG | HEART RATE: 103 BPM | BODY MASS INDEX: 29.54 KG/M2 | DIASTOLIC BLOOD PRESSURE: 92 MMHG | WEIGHT: 211 LBS

## 2021-01-26 DIAGNOSIS — R51.9 CHRONIC INTRACTABLE HEADACHE, UNSPECIFIED HEADACHE TYPE: Primary | ICD-10-CM

## 2021-01-26 DIAGNOSIS — G89.29 CHRONIC INTRACTABLE HEADACHE, UNSPECIFIED HEADACHE TYPE: ICD-10-CM

## 2021-01-26 DIAGNOSIS — I10 ESSENTIAL HYPERTENSION: ICD-10-CM

## 2021-01-26 DIAGNOSIS — Z13.29 SCREENING FOR THYROID DISORDER: ICD-10-CM

## 2021-01-26 DIAGNOSIS — R51.9 CHRONIC INTRACTABLE HEADACHE, UNSPECIFIED HEADACHE TYPE: ICD-10-CM

## 2021-01-26 DIAGNOSIS — G08 CEREBRAL VENOUS SINUS THROMBOSIS: ICD-10-CM

## 2021-01-26 DIAGNOSIS — E78.5 DYSLIPIDEMIA: ICD-10-CM

## 2021-01-26 DIAGNOSIS — K58.9 IRRITABLE BOWEL SYNDROME, UNSPECIFIED TYPE: ICD-10-CM

## 2021-01-26 DIAGNOSIS — G89.29 CHRONIC INTRACTABLE HEADACHE, UNSPECIFIED HEADACHE TYPE: Primary | ICD-10-CM

## 2021-01-26 LAB
ALBUMIN SERPL-MCNC: 4.1 G/DL (ref 3.4–5)
ALBUMIN/GLOB SERPL: 1.1 {RATIO} (ref 1–2)
ALP LIVER SERPL-CCNC: 64 U/L
ALT SERPL-CCNC: 40 U/L
ANION GAP SERPL CALC-SCNC: 4 MMOL/L (ref 0–18)
AST SERPL-CCNC: 18 U/L (ref 15–37)
BASOPHILS # BLD AUTO: 0.09 X10(3) UL (ref 0–0.2)
BASOPHILS NFR BLD AUTO: 1.1 %
BILIRUB SERPL-MCNC: 1.2 MG/DL (ref 0.1–2)
BUN BLD-MCNC: 10 MG/DL (ref 7–18)
BUN/CREAT SERPL: 11.1 (ref 10–20)
CALCIUM BLD-MCNC: 9.4 MG/DL (ref 8.5–10.1)
CHLORIDE SERPL-SCNC: 106 MMOL/L (ref 98–112)
CO2 SERPL-SCNC: 29 MMOL/L (ref 21–32)
CREAT BLD-MCNC: 0.9 MG/DL
CRP SERPL-MCNC: <0.29 MG/DL (ref ?–0.3)
DEPRECATED RDW RBC AUTO: 41.4 FL (ref 35.1–46.3)
EOSINOPHIL # BLD AUTO: 0.11 X10(3) UL (ref 0–0.7)
EOSINOPHIL NFR BLD AUTO: 1.3 %
ERYTHROCYTE [DISTWIDTH] IN BLOOD BY AUTOMATED COUNT: 12.7 % (ref 11–15)
ERYTHROCYTE [SEDIMENTATION RATE] IN BLOOD: 8 MM/HR
GLOBULIN PLAS-MCNC: 3.8 G/DL (ref 2.8–4.4)
GLUCOSE BLD-MCNC: 85 MG/DL (ref 70–99)
HCT VFR BLD AUTO: 44.7 %
HGB BLD-MCNC: 14.9 G/DL
IMM GRANULOCYTES # BLD AUTO: 0.03 X10(3) UL (ref 0–1)
IMM GRANULOCYTES NFR BLD: 0.4 %
LYMPHOCYTES # BLD AUTO: 2.47 X10(3) UL (ref 1–4)
LYMPHOCYTES NFR BLD AUTO: 30.2 %
M PROTEIN MFR SERPL ELPH: 7.9 G/DL (ref 6.4–8.2)
MCH RBC QN AUTO: 30 PG (ref 26–34)
MCHC RBC AUTO-ENTMCNC: 33.3 G/DL (ref 31–37)
MCV RBC AUTO: 89.9 FL
MONOCYTES # BLD AUTO: 0.7 X10(3) UL (ref 0.1–1)
MONOCYTES NFR BLD AUTO: 8.5 %
NEUTROPHILS # BLD AUTO: 4.79 X10 (3) UL (ref 1.5–7.7)
NEUTROPHILS # BLD AUTO: 4.79 X10(3) UL (ref 1.5–7.7)
NEUTROPHILS NFR BLD AUTO: 58.5 %
OSMOLALITY SERPL CALC.SUM OF ELEC: 286 MOSM/KG (ref 275–295)
PATIENT FASTING Y/N/NP: YES
PLATELET # BLD AUTO: 246 10(3)UL (ref 150–450)
POTASSIUM SERPL-SCNC: 4.1 MMOL/L (ref 3.5–5.1)
RBC # BLD AUTO: 4.97 X10(6)UL
SODIUM SERPL-SCNC: 139 MMOL/L (ref 136–145)
TSI SER-ACNC: 1.47 MIU/ML (ref 0.36–3.74)
WBC # BLD AUTO: 8.2 X10(3) UL (ref 4–11)

## 2021-01-26 PROCEDURE — 80053 COMPREHEN METABOLIC PANEL: CPT

## 2021-01-26 PROCEDURE — 99215 OFFICE O/P EST HI 40 MIN: CPT | Performed by: INTERNAL MEDICINE

## 2021-01-26 PROCEDURE — 84443 ASSAY THYROID STIM HORMONE: CPT

## 2021-01-26 PROCEDURE — 85025 COMPLETE CBC W/AUTO DIFF WBC: CPT

## 2021-01-26 PROCEDURE — 36415 COLL VENOUS BLD VENIPUNCTURE: CPT

## 2021-01-26 PROCEDURE — 3008F BODY MASS INDEX DOCD: CPT | Performed by: INTERNAL MEDICINE

## 2021-01-26 PROCEDURE — 3074F SYST BP LT 130 MM HG: CPT | Performed by: INTERNAL MEDICINE

## 2021-01-26 PROCEDURE — 86140 C-REACTIVE PROTEIN: CPT

## 2021-01-26 PROCEDURE — 85652 RBC SED RATE AUTOMATED: CPT

## 2021-01-26 PROCEDURE — 3080F DIAST BP >= 90 MM HG: CPT | Performed by: INTERNAL MEDICINE

## 2021-01-26 RX ORDER — CYCLOBENZAPRINE HCL 5 MG
5 TABLET ORAL 3 TIMES DAILY PRN
Qty: 60 TABLET | Refills: 1 | Status: SHIPPED | OUTPATIENT
Start: 2021-01-26 | End: 2021-04-22

## 2021-01-26 NOTE — H&P
Angel Carrion is a 41year old male. HPI:   Patient presents with:  Establish Care: Last saw Dr. Deanna Parra for primary care. He had Covid in November. He is having daily headaches. He had a positive test again December. He was asymptomatic.  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  - Tobacco: None  - EtOH: 3-4 drinks a week; makes HA worse  - Illicits: None  - Sexual Activity: with wife  - Hobbies: video game - [employer]  - Nutrition: varied - fruits, vegetables; all; drink water, green tea  - Ph Malaise  ENT/Mouth:  Hearing Changes, Ear Pain, Nasal Congestion, Sinus Pain, Hoarseness, Sore throat, Rhinorrhea, Swallowing Difficulty  Eyes: Eye Pain, Swelling, Redness, Foreign Body, Discharge, Vision Changes  Cardiovascular: Chest Pain, SOB, PND, Dysp bilaterally  Neurologic: No focal neurological deficits, CN II-XII intact, light touch intact, MSK Strength 5/5 and symmetric in all extremities, normal gait/tandem/tiptoe/heels, finger-to-nose and heel shin within normal limits.   Rapid alternating movemen notify the clinic if he is running too high or if he is having headaches with low blood pressures. –Continue amlodipine 5 mg daily    Dyslipidemia  . ASCVD: 2.2% risk of cardiovascular event in the next 10 years.   –Discussed need for optimizing nu treatment options, diet, exercise, review of available labs and radiology reports, and completing documentation.        Doyle Sevilla, 01/26/21, 11:56 AM

## 2021-01-26 NOTE — TELEPHONE ENCOUNTER
I spoke with patient and relayed Dr. Marielena Hughes message. He verbalized understanding. He is scheduled for imaging next week on 2/3/21. Invited patient to call back with any questions or concerns.

## 2021-01-26 NOTE — PATIENT INSTRUCTIONS
Given the chronicity of your daily headaches, we are obtaining blood work and we will call you with results.   We are also arranging for an MRI of the brain as well as the evaluation of the sinuses in your head area to ensure there is no clot or other struc

## 2021-01-26 NOTE — TELEPHONE ENCOUNTER
Please kindly notify the patient that all of his blood work returned normal (liver, kidney, electrolytes, inflammatory markers, thyroid, blood counts). We should proceed with the MRIs as discussed today.

## 2021-01-27 ENCOUNTER — TELEPHONE (OUTPATIENT)
Dept: INTERNAL MEDICINE CLINIC | Facility: CLINIC | Age: 46
End: 2021-01-27

## 2021-01-27 ENCOUNTER — PATIENT MESSAGE (OUTPATIENT)
Dept: INTERNAL MEDICINE CLINIC | Facility: CLINIC | Age: 46
End: 2021-01-27

## 2021-01-27 NOTE — TELEPHONE ENCOUNTER
From: Cele Ashraf  To: Jennifer Michel MD  Sent: 1/27/2021  1:14 PM CST  Subject: Visit Follow-up Question    Hi Dr. Yu Cheatham scheduled the MRI and MRV for next week (Wednesday 2/3).  I've contacted my insurance ([employer]) and I do need pre-authoriza

## 2021-01-27 NOTE — TELEPHONE ENCOUNTER
From: Elian Grace  To: Rocky iLu MD  Sent: 1/27/2021 1:14 PM CST  Subject: Visit Follow-up Question    Hi Dr. Eryn Pierson scheduled the MRI and MRV for next week (Wednesday 2/3).  I've contacted my insurance ([employer]) and I do need pre-authorizat

## 2021-02-01 NOTE — TELEPHONE ENCOUNTER
To [facility] care: MRI(s) are scheduled for 2/3. Please provide update to patient. MRI still pending    [facility] sent to patient.

## 2021-02-03 ENCOUNTER — HOSPITAL ENCOUNTER (OUTPATIENT)
Dept: MRI IMAGING | Age: 46
Discharge: HOME | End: 2021-02-03
Attending: INTERNAL MEDICINE
Payer: COMMERCIAL

## 2021-02-03 ENCOUNTER — TELEPHONE (OUTPATIENT)
Dept: INTERNAL MEDICINE CLINIC | Facility: CLINIC | Age: 46
End: 2021-02-03

## 2021-02-03 DIAGNOSIS — R51.9 CHRONIC INTRACTABLE HEADACHE, UNSPECIFIED HEADACHE TYPE: ICD-10-CM

## 2021-02-03 DIAGNOSIS — R51.9 CHRONIC INTRACTABLE HEADACHE, UNSPECIFIED HEADACHE TYPE: Primary | ICD-10-CM

## 2021-02-03 DIAGNOSIS — G89.29 CHRONIC INTRACTABLE HEADACHE, UNSPECIFIED HEADACHE TYPE: Primary | ICD-10-CM

## 2021-02-03 DIAGNOSIS — G08 CEREBRAL VENOUS SINUS THROMBOSIS: ICD-10-CM

## 2021-02-03 DIAGNOSIS — G89.29 CHRONIC INTRACTABLE HEADACHE, UNSPECIFIED HEADACHE TYPE: ICD-10-CM

## 2021-02-03 PROCEDURE — 70546 MR ANGIOGRAPH HEAD W/O&W/DYE: CPT | Performed by: INTERNAL MEDICINE

## 2021-02-03 PROCEDURE — A9575 INJ GADOTERATE MEGLUMI 0.1ML: HCPCS | Performed by: INTERNAL MEDICINE

## 2021-02-03 PROCEDURE — 70551 MRI BRAIN STEM W/O DYE: CPT | Performed by: INTERNAL MEDICINE

## 2021-02-03 RX ORDER — FLUTICASONE PROPIONATE 50 MCG
1 SPRAY, SUSPENSION (ML) NASAL DAILY
Qty: 3 BOTTLE | Refills: 3 | Status: SHIPPED | OUTPATIENT
Start: 2021-02-03

## 2021-02-03 NOTE — TELEPHONE ENCOUNTER
I reviewed the MRI Brain and MRV from 2/3/2021:    MRI  =====  CONCLUSION:   1. Mild residual maxillary sinusitis, left greater than right, improved from 08/23/2019.  2. Otherwise unremarkable noncontrast brain MRI.     MRV  FINDINGS:          The dural everton

## 2021-02-24 ENCOUNTER — PATIENT MESSAGE (OUTPATIENT)
Dept: INTERNAL MEDICINE CLINIC | Facility: CLINIC | Age: 46
End: 2021-02-24

## 2021-02-25 RX ORDER — SUMATRIPTAN 50 MG/1
50 TABLET, FILM COATED ORAL EVERY 2 HOUR PRN
Qty: 9 TABLET | Refills: 3 | Status: SHIPPED | OUTPATIENT
Start: 2021-02-25 | End: 2021-04-22

## 2021-02-25 NOTE — TELEPHONE ENCOUNTER
Routed to Dr. Nabeel Packer to advise. See 2nd Half of Message Below. .. Non-Urgent Medical Question    Krissy Perez MD 17 hours ago (7:43 PM)        (Continuation of last email. ..character limits)  I also wonder if the headaches aren’t

## 2021-02-25 NOTE — TELEPHONE ENCOUNTER
Called patient:    Stayed about the same, hasn't changed since the last visit. Has been having some urinary issues with the zyrtec. Does not have problems with congestion. Did try the flexeril but caused drowsiness.  There are times that the headache is wor

## 2021-03-02 ENCOUNTER — PATIENT MESSAGE (OUTPATIENT)
Dept: INTERNAL MEDICINE CLINIC | Facility: CLINIC | Age: 46
End: 2021-03-02

## 2021-03-02 DIAGNOSIS — R51.9 CHRONIC INTRACTABLE HEADACHE, UNSPECIFIED HEADACHE TYPE: Primary | ICD-10-CM

## 2021-03-02 DIAGNOSIS — G89.29 CHRONIC INTRACTABLE HEADACHE, UNSPECIFIED HEADACHE TYPE: Primary | ICD-10-CM

## 2021-03-02 NOTE — TELEPHONE ENCOUNTER
From: [facility]  To: Isak Nieto MD  Sent: 3/2/2021 2:50 PM CST  Subject: Non-Urgent Medical Question    Hi Doctor Sevilla,    Thanks again for offering to write and sign a note clearing me to travel.  Here's what's needed for travel to [place]:    M

## 2021-03-03 NOTE — TELEPHONE ENCOUNTER
I drafted a letter under the communications tab for patient's review. He will let me know if there needs to be changes to it.   Of note, he needs us letter within 14 days from departure date and I have asked him if he needs a signed copy in hand within patricia

## 2021-03-05 NOTE — TELEPHONE ENCOUNTER
Alex message responded. I have provided a letter in my out box for patient to  at his earliest convenience. I have notified him via [address]  FYI to pool - letter is in my outbox    Neurology referral for chronic headache:    Dr. Roberto Bobo  [phone]

## 2021-03-15 ENCOUNTER — PATIENT MESSAGE (OUTPATIENT)
Dept: INTERNAL MEDICINE CLINIC | Facility: CLINIC | Age: 46
End: 2021-03-15

## 2021-03-16 NOTE — TELEPHONE ENCOUNTER
From: Beatriz Curry  To: Marina Navarrete MD  Sent: 3/15/2021 11:50 PM CDT  Subject: Non-Urgent Medical Question    Hi Doctor Sevilla,    Thanks again for the travel note.  I just submitted it to the portal for travel to [facility] and they've asked for this:

## 2021-03-19 ENCOUNTER — OFFICE VISIT (OUTPATIENT)
Dept: OTOLARYNGOLOGY | Facility: CLINIC | Age: 46
End: 2021-03-19
Payer: COMMERCIAL

## 2021-03-19 VITALS
SYSTOLIC BLOOD PRESSURE: 136 MMHG | DIASTOLIC BLOOD PRESSURE: 82 MMHG | TEMPERATURE: 98 F | WEIGHT: 208 LBS | HEIGHT: 71 IN | BODY MASS INDEX: 29.12 KG/M2

## 2021-03-19 DIAGNOSIS — J01.40 SUBACUTE PANSINUSITIS: ICD-10-CM

## 2021-03-19 DIAGNOSIS — G44.221 CHRONIC TENSION-TYPE HEADACHE, INTRACTABLE: Primary | ICD-10-CM

## 2021-03-19 PROCEDURE — 3008F BODY MASS INDEX DOCD: CPT | Performed by: OTOLARYNGOLOGY

## 2021-03-19 PROCEDURE — 99214 OFFICE O/P EST MOD 30 MIN: CPT | Performed by: OTOLARYNGOLOGY

## 2021-03-19 PROCEDURE — 3079F DIAST BP 80-89 MM HG: CPT | Performed by: OTOLARYNGOLOGY

## 2021-03-19 PROCEDURE — 3075F SYST BP GE 130 - 139MM HG: CPT | Performed by: OTOLARYNGOLOGY

## 2021-03-19 RX ORDER — PREDNISONE 20 MG/1
TABLET ORAL
Qty: 30 TABLET | Refills: 0 | Status: SHIPPED | OUTPATIENT
Start: 2021-03-19 | End: 2021-04-22 | Stop reason: ALTCHOICE

## 2021-03-19 NOTE — PROGRESS NOTES
Rebeka Eugene is a 40year old male. Patient presents with:  Sinus Problem: c/o sinus  headache since november       HISTORY OF PRESENT ILLNESS   8/16/2019    The patient presents with chronic epiphora.   He is seen an ophthalmologist and was told th Smokeless tobacco: Never Used    Vaping Use      Vaping Use: Never used    Substance and Sexual Activity      Alcohol use:  Yes        Alcohol/week: 3.3 standard drinks        Types: 4 Glasses of wine per week        Comment: Occasional glass of wine      D Date   • COLONOSCOPY  2015         REVIEW OF SYSTEMS    System Neg/Pos Details   Constitutional Negative Fatigue, fever and weight loss. ENMT Negative Headaches and neck pain   Eyes Negative Blurred vision and vision changes.    Respiratory Negative Dyspn Slight erythema       Current Outpatient Medications:   •  predniSONE 20 MG Oral Tab, 3 po for 7d, 2 po for 3 d, 1 po for 2 d, 1/2 for 2 d, Disp: 30 tablet, Rfl: 0  •  SUMAtriptan Succinate 50 MG Oral Tab, Take 1 tablet (50 mg total) by mouth every 2 (two)

## 2021-04-09 ENCOUNTER — HOSPITAL ENCOUNTER (OUTPATIENT)
Dept: CT IMAGING | Facility: HOSPITAL | Age: 46
Discharge: HOME | End: 2021-04-09
Attending: OTOLARYNGOLOGY
Payer: COMMERCIAL

## 2021-04-09 DIAGNOSIS — J01.40 SUBACUTE PANSINUSITIS: ICD-10-CM

## 2021-04-09 PROCEDURE — 70486 CT MAXILLOFACIAL W/O DYE: CPT | Performed by: OTOLARYNGOLOGY

## 2021-04-14 ENCOUNTER — OFFICE VISIT (OUTPATIENT)
Dept: OTOLARYNGOLOGY | Facility: CLINIC | Age: 46
End: 2021-04-14
Payer: COMMERCIAL

## 2021-04-14 VITALS
DIASTOLIC BLOOD PRESSURE: 98 MMHG | WEIGHT: 208 LBS | HEIGHT: 71 IN | SYSTOLIC BLOOD PRESSURE: 139 MMHG | TEMPERATURE: 98 F | BODY MASS INDEX: 29.12 KG/M2

## 2021-04-14 DIAGNOSIS — G44.221 CHRONIC TENSION-TYPE HEADACHE, INTRACTABLE: ICD-10-CM

## 2021-04-14 DIAGNOSIS — J01.40 SUBACUTE PANSINUSITIS: Primary | ICD-10-CM

## 2021-04-14 PROCEDURE — 3080F DIAST BP >= 90 MM HG: CPT | Performed by: OTOLARYNGOLOGY

## 2021-04-14 PROCEDURE — 99214 OFFICE O/P EST MOD 30 MIN: CPT | Performed by: OTOLARYNGOLOGY

## 2021-04-14 PROCEDURE — 3008F BODY MASS INDEX DOCD: CPT | Performed by: OTOLARYNGOLOGY

## 2021-04-14 PROCEDURE — 3075F SYST BP GE 130 - 139MM HG: CPT | Performed by: OTOLARYNGOLOGY

## 2021-04-14 NOTE — PROGRESS NOTES
Kimmy Appiah is a 40year old male. Patient presents with:  Results: ct of sinus 04/09. pt c/o constant headaches. HISTORY OF PRESENT ILLNESS   8/16/2019    The patient presents with chronic epiphora.   He is seen an ophthalmologist and was jeremias History    Socioeconomic History      Marital status:       Spouse name: Not on file      Number of children: 2      Years of education: Not on file      Highest education level: Not on file    Occupational History      Occupation: [employer] rash.   Hema/Lymph Negative Easy bleeding and easy bruising.            PHYSICAL EXAM    BP (!) 139/98 (BP Location: Right arm, Patient Position: Sitting, Cuff Size: adult)   Temp 98.2 °F (36.8 °C) (Tympanic)   Ht 5' 11\" (1.803 m)   Wt 208 lb (94.3 kg)   B tablet, Rfl: 3  •  Fluticasone Propionate 50 MCG/ACT Nasal Suspension, 1 spray by Each Nare route daily. , Disp: 3 Bottle, Rfl: 3  •  cyclobenzaprine 5 MG Oral Tab, Take 1 tablet (5 mg total) by mouth 3 (three) times daily as needed for Muscle spasms. , Disp

## 2021-04-22 ENCOUNTER — OFFICE VISIT (OUTPATIENT)
Dept: NEUROLOGY | Facility: CLINIC | Age: 46
End: 2021-04-22
Payer: COMMERCIAL

## 2021-04-22 VITALS
DIASTOLIC BLOOD PRESSURE: 80 MMHG | HEART RATE: 80 BPM | HEIGHT: 71 IN | WEIGHT: 208 LBS | SYSTOLIC BLOOD PRESSURE: 126 MMHG | BODY MASS INDEX: 29.12 KG/M2

## 2021-04-22 DIAGNOSIS — R51.9 DAILY HEADACHE: Primary | ICD-10-CM

## 2021-04-22 PROCEDURE — 3074F SYST BP LT 130 MM HG: CPT | Performed by: OTHER

## 2021-04-22 PROCEDURE — 3079F DIAST BP 80-89 MM HG: CPT | Performed by: OTHER

## 2021-04-22 PROCEDURE — 3008F BODY MASS INDEX DOCD: CPT | Performed by: OTHER

## 2021-04-22 PROCEDURE — 99205 OFFICE O/P NEW HI 60 MIN: CPT | Performed by: OTHER

## 2021-04-22 RX ORDER — MAGNESIUM OXIDE 400 MG/1
400 TABLET ORAL DAILY
Qty: 90 TABLET | Refills: 3 | Status: SHIPPED | OUTPATIENT
Start: 2021-04-22 | End: 2022-04-17

## 2021-04-22 NOTE — PROGRESS NOTES
[facility]  [address] [place] [address], [address] [place] [address]  [phone]          [facility]  NEUROLOGY VISIT FOR HEADACHE  Reason for Admission/Consultation: post COVID HA  Requested by:   PCP: CAROL Patterson couple of years. \"     His current HA are different  Not throbbing, not stabbing  Starts out as a 2-3/10 on 1-10 scale. Peaks at a 5-6/10. He has a bitemporal HA; sometimes has pain over the bridge of his nose. Very minor photophobia, phonophobia.    Wa HA; some days they are worse and progress throughout the day.     Symptom progression in past six months:  See above      Relevant personal history, habits and occupation:   Sleep pattern/sleep disturbances, significant stressors?: No but reports a chronic myalgia, night sweats, weight loss, comorbid systemic disease, immunocompromise state, malignancy, pregnancy, or postpartum No  - Neurological symptoms/signs including changes in mental status or level of consciousness, diplopia, abnormal cranial nerve fun • Hypertension Mother    • Crohn's Disease Paternal Grandmother    • Diabetes Paternal Grandfather    • Hypertension Sister    • Prostate Cancer Paternal Uncle    • Diabetes Paternal Uncle    • Heart Disease Maternal Grandfather          CHF age 73 Hearing grossly intact. Tongue midline. No atrophy or fasiculations of the tongue noted. Palate and uvula elevate symmetrically. Shoulder shrug symmetric.  - Fundoscopic exam:normal w/o hemorrhages, exudates, or papilledema. No attenuation.  No pallor.  - ALT 40 01/26/2021    AST 18 01/26/2021    ALKPHOS 59 03/16/2015    ALB 4.1 01/26/2021     01/26/2021    K 4.1 01/26/2021     01/26/2021    CO2 29.0 01/26/2021      I have reviewed labs.     Performed an independent visualization of: MRI brain against a diagnosis is that he did not improve with sumatriptan hand. 3. 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   What argues against a diagnosis is that his sinu are overused when taken on 10 or more days per month. • Avoid triggers. Education/Instructions given to: patient  Instructed patient to call my office or seek medical attention immediately if symptoms worsen.   All questions were answered to th

## 2021-04-22 NOTE — PATIENT INSTRUCTIONS
Lifestyle modifications:  • Recommend  a stable daily schedule as changes in a pt's daily schedule trigger HAs. This includes bedtime and wake up time, eating meals regularly, exercising, and maintaining a consistently low stress lifestyle.   • Exercise: cleaning products, gasoline, glue, perfume, and paint can be triggers. So can tobacco smoke, including secondhand smoke. · Emotions. Stress can trigger headaches or make them worse once they start. · Sleep disruption.  Staying up late, sleeping late, and take to help prevent migraines. Medicines to help prevent migraines   · Your healthcare provider may prescribe certain medicines to help prevent migraines. These medicines may need to be taken daily.  Or they may only need to be taken at times when [facility] [address] to learn if you may be eligible for financial support with your medication(s). Msg & Data Rates May Apply. Msg freq varies. Terms apply. Text HELP for help. Text STOP to end. Riboflavin, Vitamin B2 tablets  What is this medicine?   RIBOFLAVIN; VI or preservatives  · pregnant or trying to get pregnant  · breast-feeding  What should I watch for while using this medicine? Follow a good diet. Taking a vitamin supplement does not replace the need for a balanced diet.  Some foods that have vitamin B2 are bothersome):  · diarrhea  What may interact with this medicine?   · antibiotics like ciprofloxacin, doxycycline, tetracycline  · cefditoren  · cellulose sodium phosphate  · edetate disodium, disodium EDTA  · medicines for chest pain like digoxin, nifedipine directions. · Natural does not mean a product is safe for humans to take. · Look for products that include USP after the ingredient name. This means that the  followed the standards of the [address] [place] [address] [place].   · Supplements made or sold by a na

## 2021-04-28 ENCOUNTER — TELEPHONE (OUTPATIENT)
Dept: INTERNAL MEDICINE CLINIC | Facility: CLINIC | Age: 46
End: 2021-04-28

## 2021-04-28 RX ORDER — AMLODIPINE BESYLATE 5 MG/1
TABLET ORAL
Qty: 30 TABLET | Refills: 3 | OUTPATIENT
Start: 2021-04-28

## 2021-04-28 NOTE — TELEPHONE ENCOUNTER
----- Message from Kenrick Patel sent at 4/28/2021  1:13 PM CDT -----  Regarding: Prescription Question  Contact: [phone]  Hi Dr. Jamaal Bernard,    I'm about out of the amLODIPine Besylate 5 MG Tabs prescription and it currently doesn't have any refills.

## 2021-04-30 RX ORDER — AMLODIPINE BESYLATE 5 MG/1
5 TABLET ORAL DAILY
Qty: 90 TABLET | Refills: 3 | Status: SHIPPED | OUTPATIENT
Start: 2021-04-30 | End: 2022-04-25

## 2021-12-15 ENCOUNTER — OFFICE VISIT (OUTPATIENT)
Dept: FAMILY MEDICINE CLINIC | Facility: CLINIC | Age: 46
End: 2021-12-15
Payer: COMMERCIAL

## 2021-12-15 VITALS
RESPIRATION RATE: 14 BRPM | TEMPERATURE: 97 F | OXYGEN SATURATION: 98 % | SYSTOLIC BLOOD PRESSURE: 125 MMHG | DIASTOLIC BLOOD PRESSURE: 80 MMHG | HEART RATE: 72 BPM

## 2021-12-15 DIAGNOSIS — J00 ACUTE NASOPHARYNGITIS: Primary | ICD-10-CM

## 2021-12-15 DIAGNOSIS — J02.9 SORE THROAT: ICD-10-CM

## 2021-12-15 PROCEDURE — 3079F DIAST BP 80-89 MM HG: CPT | Performed by: NURSE PRACTITIONER

## 2021-12-15 PROCEDURE — 3074F SYST BP LT 130 MM HG: CPT | Performed by: NURSE PRACTITIONER

## 2021-12-15 PROCEDURE — 99213 OFFICE O/P EST LOW 20 MIN: CPT | Performed by: NURSE PRACTITIONER

## 2021-12-15 PROCEDURE — 87880 STREP A ASSAY W/OPTIC: CPT | Performed by: NURSE PRACTITIONER

## 2021-12-15 PROCEDURE — U0002 COVID-19 LAB TEST NON-CDC: HCPCS | Performed by: NURSE PRACTITIONER

## 2021-12-15 RX ORDER — AMOXICILLIN AND CLAVULANATE POTASSIUM 875; 125 MG/1; MG/1
1 TABLET, FILM COATED ORAL 2 TIMES DAILY
Qty: 20 TABLET | Refills: 0 | Status: SHIPPED | OUTPATIENT
Start: 2021-12-15 | End: 2021-12-25

## 2021-12-15 NOTE — PATIENT INSTRUCTIONS
Adult Self-Care for Colds  Colds are caused by viruses. They can't be cured with antibiotics. But you can ease symptoms and support your body's efforts to heal itself.  No matter which symptoms you have, be sure to:  · Drink plenty of fluids (water or timur should not have. When to call your healthcare provider  When you first notice symptoms, ask your provider if you should take antiviral medicines. Antibiotics should not be taken for colds or flu.  Also call your provider if you have any of these symptoms include extreme tiredness (wanting to stay in bed all day), chills, fevers, muscle aches, soreness with eye movement, headache, and a dry, hacking cough. Antiviral medicine for the flu is available by prescription.  If you start taking it within 48 hours, mucus  · Chest pain, shortness of breath, wheezing, or trouble breathing  · Severe headache, or face, neck, or ear pain  · New rash with fever  · Fever of 100.4°F (38°C) or higher, or as directed by your healthcare provider  · Confusion, behavior change, o

## 2021-12-15 NOTE — PROGRESS NOTES
CHIEF COMPLAINT:   Patient presents with:  Flu      HPI:   Rebeka Eugene is a 40year old male who presents for upper respiratory symptoms for  5 days. Patient reports \"head cold\". Generalized HA mild/moderate, constant and chills.  Scratchy sore throa wine per week      Comment: Occasional glass of wine    Drug use: No        REVIEW OF SYSTEMS:   GENERAL: feels well otherwise  SKIN: no rashes or abnormal skin lesions  HEENT: See HPI. Denies sinus pain or congestion.    LUNGS: denies shortness of breath o discussed. Patient Instructions     Adult Self-Care for Colds  Colds are caused by viruses. They can't be cured with antibiotics. But you can ease symptoms and support your body's efforts to heal itself.  No matter which symptoms you have, be sure to:  · healthcare provider if there are any foods you should not have. When to call your healthcare provider  When you first notice symptoms, ask your provider if you should take antiviral medicines. Antibiotics should not be taken for colds or flu.  Also call

## 2022-01-17 RX ORDER — AMLODIPINE BESYLATE 5 MG/1
TABLET ORAL
Qty: 90 TABLET | Refills: 3 | OUTPATIENT
Start: 2022-01-17

## 2022-01-17 NOTE — TELEPHONE ENCOUNTER
Current refill request refused due to refill is either a duplicate request or has active refills at the pharmacy. Check previous templates.     Requested Prescriptions     Refused Prescriptions Disp Refills   • AMLODIPINE 5 MG Oral Tab [Pharmacy Med Name:

## 2022-04-10 NOTE — PATIENT INSTRUCTIONS
- You were seen in clinic for regular annual check-up. We have ordered labs for you and we will call you with the results. Please obtain the bloodwork fasting for 12 hours. OK to drink water the day of your blood draw  -There is concern for hernia on the left side causing the scrotal discomfort. We should confirm this with an ultrasound of both sides. We should also obtain a dedicated scrotal ultrasound of the left side to work-up alternative causes. - We are happy to hear that your headaches have resolved with Dr. Mike King next colonoscopy may be due now for screening recommendations. Please make an appointment with Dr. Fabiana Suazo  -Please continue checking your blood pressures at home and notify us if they are increasing  - Please continue following up with Dr. Ahsan Lewis of ENT and Dr. Tabitha Huitron of Neurology if needed  - Please continue to eat a varied diet including recommended servings of vegetables, fruits, and low fat dairy. Minimize high saturated fats (such as fast foods) and high sugar intake (such as soda)  - We recommend 150 minutes of moderate intensity exercise (brisk walking, swimming) weekly to maintain your current weight. Targeted weight loss will require more vigorous exercise or more than 150 minutes/week.     Return to clinic in 6 months for follow-up

## 2022-04-13 ENCOUNTER — OFFICE VISIT (OUTPATIENT)
Dept: INTERNAL MEDICINE CLINIC | Facility: CLINIC | Age: 47
End: 2022-04-13
Payer: COMMERCIAL

## 2022-04-13 VITALS
WEIGHT: 213.81 LBS | SYSTOLIC BLOOD PRESSURE: 118 MMHG | HEART RATE: 74 BPM | BODY MASS INDEX: 29.93 KG/M2 | DIASTOLIC BLOOD PRESSURE: 88 MMHG | OXYGEN SATURATION: 97 % | TEMPERATURE: 98 F | HEIGHT: 71 IN

## 2022-04-13 DIAGNOSIS — K58.9 IRRITABLE BOWEL SYNDROME, UNSPECIFIED TYPE: ICD-10-CM

## 2022-04-13 DIAGNOSIS — Z13.0 SCREENING FOR IRON DEFICIENCY ANEMIA: ICD-10-CM

## 2022-04-13 DIAGNOSIS — G89.29 CHRONIC NONINTRACTABLE HEADACHE, UNSPECIFIED HEADACHE TYPE: ICD-10-CM

## 2022-04-13 DIAGNOSIS — K40.20 BILATERAL INGUINAL HERNIA WITHOUT OBSTRUCTION OR GANGRENE, RECURRENCE NOT SPECIFIED: ICD-10-CM

## 2022-04-13 DIAGNOSIS — I10 ESSENTIAL HYPERTENSION: ICD-10-CM

## 2022-04-13 DIAGNOSIS — Z00.00 ANNUAL PHYSICAL EXAM: ICD-10-CM

## 2022-04-13 DIAGNOSIS — E78.5 DYSLIPIDEMIA: ICD-10-CM

## 2022-04-13 DIAGNOSIS — R51.9 CHRONIC NONINTRACTABLE HEADACHE, UNSPECIFIED HEADACHE TYPE: ICD-10-CM

## 2022-04-13 DIAGNOSIS — Z13.29 SCREENING FOR THYROID DISORDER: ICD-10-CM

## 2022-04-13 DIAGNOSIS — N50.82 SCROTUM PAIN: ICD-10-CM

## 2022-04-13 DIAGNOSIS — Z12.11 SCREENING FOR COLON CANCER: ICD-10-CM

## 2022-04-13 DIAGNOSIS — Z00.00 ROUTINE GENERAL MEDICAL EXAMINATION AT A HEALTH CARE FACILITY: Primary | ICD-10-CM

## 2022-04-13 PROCEDURE — 3074F SYST BP LT 130 MM HG: CPT | Performed by: INTERNAL MEDICINE

## 2022-04-13 PROCEDURE — 3008F BODY MASS INDEX DOCD: CPT | Performed by: INTERNAL MEDICINE

## 2022-04-13 PROCEDURE — 3079F DIAST BP 80-89 MM HG: CPT | Performed by: INTERNAL MEDICINE

## 2022-04-13 PROCEDURE — 99396 PREV VISIT EST AGE 40-64: CPT | Performed by: INTERNAL MEDICINE

## 2022-04-13 RX ORDER — AMLODIPINE BESYLATE 5 MG/1
5 TABLET ORAL DAILY
Qty: 90 TABLET | Refills: 3 | Status: SHIPPED | OUTPATIENT
Start: 2022-04-13 | End: 2023-04-08

## 2022-04-13 RX ORDER — ACETAMINOPHEN 160 MG
2000 TABLET,DISINTEGRATING ORAL DAILY
COMMUNITY

## 2022-04-14 ENCOUNTER — TELEPHONE (OUTPATIENT)
Dept: GASTROENTEROLOGY | Facility: CLINIC | Age: 47
End: 2022-04-14

## 2022-04-14 ENCOUNTER — LAB ENCOUNTER (OUTPATIENT)
Dept: LAB | Age: 47
End: 2022-04-14
Attending: INTERNAL MEDICINE
Payer: COMMERCIAL

## 2022-04-14 ENCOUNTER — TELEPHONE (OUTPATIENT)
Dept: INTERNAL MEDICINE CLINIC | Facility: CLINIC | Age: 47
End: 2022-04-14

## 2022-04-14 DIAGNOSIS — Z13.29 SCREENING FOR THYROID DISORDER: ICD-10-CM

## 2022-04-14 DIAGNOSIS — Z00.00 ROUTINE GENERAL MEDICAL EXAMINATION AT A HEALTH CARE FACILITY: ICD-10-CM

## 2022-04-14 DIAGNOSIS — Z13.0 SCREENING FOR IRON DEFICIENCY ANEMIA: ICD-10-CM

## 2022-04-14 DIAGNOSIS — E78.5 DYSLIPIDEMIA: ICD-10-CM

## 2022-04-14 LAB
ALBUMIN SERPL-MCNC: 3.9 G/DL (ref 3.4–5)
ALBUMIN/GLOB SERPL: 1.3 {RATIO} (ref 1–2)
ALP LIVER SERPL-CCNC: 71 U/L
ALT SERPL-CCNC: 53 U/L
ANION GAP SERPL CALC-SCNC: 5 MMOL/L (ref 0–18)
AST SERPL-CCNC: 27 U/L (ref 15–37)
BASOPHILS # BLD AUTO: 0.07 X10(3) UL (ref 0–0.2)
BASOPHILS NFR BLD AUTO: 0.9 %
BILIRUB SERPL-MCNC: 1.1 MG/DL (ref 0.1–2)
BUN BLD-MCNC: 11 MG/DL (ref 7–18)
BUN/CREAT SERPL: 12.5 (ref 10–20)
CALCIUM BLD-MCNC: 9 MG/DL (ref 8.5–10.1)
CHLORIDE SERPL-SCNC: 109 MMOL/L (ref 98–112)
CHOLEST SERPL-MCNC: 189 MG/DL (ref ?–200)
CO2 SERPL-SCNC: 28 MMOL/L (ref 21–32)
CREAT BLD-MCNC: 0.88 MG/DL
DEPRECATED RDW RBC AUTO: 42.1 FL (ref 35.1–46.3)
EOSINOPHIL # BLD AUTO: 0.15 X10(3) UL (ref 0–0.7)
EOSINOPHIL NFR BLD AUTO: 1.9 %
ERYTHROCYTE [DISTWIDTH] IN BLOOD BY AUTOMATED COUNT: 12.6 % (ref 11–15)
FASTING PATIENT LIPID ANSWER: YES
FASTING STATUS PATIENT QL REPORTED: YES
GLOBULIN PLAS-MCNC: 3.1 G/DL (ref 2.8–4.4)
GLUCOSE BLD-MCNC: 86 MG/DL (ref 70–99)
HCT VFR BLD AUTO: 48.6 %
HDLC SERPL-MCNC: 46 MG/DL (ref 40–59)
HGB BLD-MCNC: 15.8 G/DL
IMM GRANULOCYTES # BLD AUTO: 0.03 X10(3) UL (ref 0–1)
IMM GRANULOCYTES NFR BLD: 0.4 %
LDLC SERPL CALC-MCNC: 122 MG/DL (ref ?–100)
LYMPHOCYTES # BLD AUTO: 2.48 X10(3) UL (ref 1–4)
LYMPHOCYTES NFR BLD AUTO: 31.5 %
MCH RBC QN AUTO: 29.7 PG (ref 26–34)
MCHC RBC AUTO-ENTMCNC: 32.5 G/DL (ref 31–37)
MCV RBC AUTO: 91.4 FL
MONOCYTES # BLD AUTO: 0.66 X10(3) UL (ref 0.1–1)
MONOCYTES NFR BLD AUTO: 8.4 %
NEUTROPHILS # BLD AUTO: 4.48 X10 (3) UL (ref 1.5–7.7)
NEUTROPHILS # BLD AUTO: 4.48 X10(3) UL (ref 1.5–7.7)
NEUTROPHILS NFR BLD AUTO: 56.9 %
NONHDLC SERPL-MCNC: 143 MG/DL (ref ?–130)
OSMOLALITY SERPL CALC.SUM OF ELEC: 293 MOSM/KG (ref 275–295)
PLATELET # BLD AUTO: 234 10(3)UL (ref 150–450)
POTASSIUM SERPL-SCNC: 4.3 MMOL/L (ref 3.5–5.1)
PROT SERPL-MCNC: 7 G/DL (ref 6.4–8.2)
RBC # BLD AUTO: 5.32 X10(6)UL
SODIUM SERPL-SCNC: 142 MMOL/L (ref 136–145)
TRIGL SERPL-MCNC: 114 MG/DL (ref 30–149)
TSI SER-ACNC: 1.24 MIU/ML (ref 0.36–3.74)
VLDLC SERPL CALC-MCNC: 20 MG/DL (ref 0–30)
WBC # BLD AUTO: 7.9 X10(3) UL (ref 4–11)

## 2022-04-14 PROCEDURE — 85025 COMPLETE CBC W/AUTO DIFF WBC: CPT

## 2022-04-14 PROCEDURE — 36415 COLL VENOUS BLD VENIPUNCTURE: CPT

## 2022-04-14 PROCEDURE — 84443 ASSAY THYROID STIM HORMONE: CPT

## 2022-04-14 PROCEDURE — 80053 COMPREHEN METABOLIC PANEL: CPT

## 2022-04-14 PROCEDURE — 80061 LIPID PANEL: CPT

## 2022-04-14 RX ORDER — POLYETHYLENE GLYCOL 3350, SODIUM CHLORIDE, SODIUM BICARBONATE, POTASSIUM CHLORIDE 420; 11.2; 5.72; 1.48 G/4L; G/4L; G/4L; G/4L
POWDER, FOR SOLUTION ORAL
Qty: 4000 ML | Refills: 0 | Status: SHIPPED | OUTPATIENT
Start: 2022-04-14

## 2022-04-14 NOTE — TELEPHONE ENCOUNTER
Erik Alcantar    Patient now over age of 40 and would like to schedule screening colonoscopy. Please provide orders if appropriate. Thank you    Last Procedure, Date, MD: Dr. Giovanni Jones colonoscopy 4/10/2015   Last Diagnosis:  Small Internal hemorrhoids, slight changes in distal rectum and one focal area in the sigmoid with slight loss of vascular markings  Recalled for (mth/yrs): PCP put in referral for screening colonoscopy because he is now over age of 40  Sedation used previously:  IV  Last Prep Used (if known):  Miralax  Quality of prep (if known): good  Anticoagulants: no  Diabetic Meds: no  BP meds(Ace inhibitors/ARB's): no  Weight loss meds (phentermine/vyvanse): no  Iron supplement (RX/OTC): no  Height & Weight/BMI: 5/11\" 213lbs BMI 29.83  Hx of Cardiac/CVA issues/(MI/Stroke): no  Devices Pacemaker/Defibrillator/Stents: no  Resp. Issues/Oxygen Use/JESSICA/COPD: no  Issues w/Anesthesia: no    Symptoms (Y/N): no  Symptoms Details: no    Special comments/notes: n/a    Please advise on orders and prep, thank you.

## 2022-04-14 NOTE — TELEPHONE ENCOUNTER
The patient's chart has been reviewed. Okay to schedule pt for CLN r/t screening with Dr. Polo Oakley. Advise IV [place] or MAC sedation with split dose Colyte/TriLyte or equivalent(eRx) preparation.   -Eligible for NE: Yes r/t BMI < 40  -Denies history of bleeding/clotting disorders.      -Please note: It is the patient's responsibility to contact his/her insurance company regarding questions about out-of-pocket cost/benefits. Please provide the appropriate diagnostic information/codes.      May continue all medications listed in the med module except:  -Anti-platelets and anti-coagulants: none  -Diabetes meds: none    ** If MAC:    - HOLD ACE/ARBs the night before and/or the day of the procedure(s)  - N/A   - NO alcohol, recreational drugs nor erectile dysfunction medications 24 hours before procedure(s)   - NO herbal supplements or weight loss medications (phentermine/Vyvanse/Adderall) x 7 days prior to the procedure(s)    ** If MAC @ [facility] or IV twilight - continue all medications as prescribed    ** COVID-19 testing required 72 hours prior to procedure

## 2022-04-14 NOTE — TELEPHONE ENCOUNTER
Please notify patient I reviewed his blood work from 4/14/2022    LDL/bad cholesterol borderline elevated at 122, no medications warranted. Continue with optimizing nutrition and good exercise habits as he has been doing. All other blood tests look good, no new recommendations for now.   We will follow-up on the results of the ultrasound

## 2022-04-15 NOTE — TELEPHONE ENCOUNTER
Scheduled for:  Colonoscopy [id number]  Provider Name:  Dr. Inés Hurley  Date:  5/23/22  Location:    [address] [place] [address] 1  Sedation:  MAC  Time:  1500 (pt is aware to arrive at 1400)   Prep:  Trilyte, sent via [facility] [address] on 4/15/22  Meds/Allergies Reconciled?:  Physician reviewed   Diagnosis with codes:  Colon cancer screening Z12.11  Was patient informed to call insurance with codes (Y/N):  Yes, I confirmed [employer] PPO insurance with the patient. Referral sent?:  Referral was sent at the time of electronic surgical scheduling. [facility] or [address] notified?:  I sent an electronic request to Endo Scheduling and received a confirmation today. Medication Orders: This patient verbally confirmed that he is not taking:   Iron, blood thinners, BP meds, and is not diabetic   Not latex allergy, Not PCN allergy and does not have a pacemaker   This patient is aware to HOLD all supplements x 7 days before the procedure. Misc Orders:       Further instructions given by staff:    This patient had no questions regarding the prep instructions

## 2022-04-27 ENCOUNTER — HOSPITAL ENCOUNTER (OUTPATIENT)
Dept: ULTRASOUND IMAGING | Facility: HOSPITAL | Age: 47
Discharge: HOME | End: 2022-04-27
Attending: INTERNAL MEDICINE
Payer: COMMERCIAL

## 2022-04-27 DIAGNOSIS — N50.82 SCROTUM PAIN: ICD-10-CM

## 2022-04-27 PROCEDURE — 76870 US EXAM SCROTUM: CPT | Performed by: INTERNAL MEDICINE

## 2022-04-27 PROCEDURE — 93975 VASCULAR STUDY: CPT | Performed by: INTERNAL MEDICINE

## 2022-04-28 ENCOUNTER — TELEPHONE (OUTPATIENT)
Dept: INTERNAL MEDICINE CLINIC | Facility: CLINIC | Age: 47
End: 2022-04-28

## 2022-04-28 ENCOUNTER — HOSPITAL ENCOUNTER (OUTPATIENT)
Dept: ULTRASOUND IMAGING | Facility: HOSPITAL | Age: 47
Discharge: HOME | End: 2022-04-28
Attending: INTERNAL MEDICINE
Payer: COMMERCIAL

## 2022-04-28 DIAGNOSIS — K40.20 BILATERAL INGUINAL HERNIA WITHOUT OBSTRUCTION OR GANGRENE, RECURRENCE NOT SPECIFIED: ICD-10-CM

## 2022-04-28 PROCEDURE — 76882 US LMTD JT/FCL EVL NVASC XTR: CPT | Performed by: INTERNAL MEDICINE

## 2022-05-05 ENCOUNTER — TELEPHONE (OUTPATIENT)
Dept: GASTROENTEROLOGY | Facility: CLINIC | Age: 47
End: 2022-05-05

## 2022-05-13 NOTE — TELEPHONE ENCOUNTER
5 Mm Punch Excision Text: A 5 mm punch biopsy was used to excise the lesion to the level of the subcutaneous fat.  Blunt dissection was used to free the lesion from the surrounding tissues and the lesion was removed. CBLM to reschedule procedure.  [facility] Bill For Surgical Tray: no 8 Mm Punch Excision Text: A 8 mm punch biopsy was used to excise the lesion to the level of the subcutaneous fat.  Blunt dissection was used to free the lesion from the surrounding tissues and the lesion was removed. Number Of Epidermal Sutures (Optional): 2 Lab Facility:  7 Mm Punch Excision Text: A 7 mm punch biopsy was used to excise the lesion to the level of the subcutaneous fat.  Blunt dissection was used to free the lesion from the surrounding tissues and the lesion was removed. 3 Mm Punch Excision Text: A 3 mm punch biopsy was used to excise the lesion to the level of the subcutaneous fat.  Blunt dissection was used to free the lesion from the surrounding tissues and the lesion was removed. 2.5 Mm Punch Excision Text: A 2.5 mm punch biopsy was used to excise the lesion to the level of the subcutaneous fat.  Blunt dissection was used to free the lesion from the surrounding tissues and the lesion was removed. Billing Type: Third-Party Bill 1.5 Mm Punch Excision Text: A 1.5 mm punch biopsy was used to excise the lesion to the level of the subcutaneous fat.  Blunt dissection was used to free the lesion from the surrounding tissues and the lesion was removed. 12 Mm Punch Excision Text: A 12 mm punch biopsy was used to excise the lesion to the level of the subcutaneous fat.  Blunt dissection was used to free the lesion from the surrounding tissues and the lesion was removed. Notification Instructions: Patient will be notified of biopsy results. However, patient instructed to call the office if not contacted within 2 weeks. 10 Mm Punch Excision Text: A 10 mm punch biopsy was used to excise the lesion to the level of the subcutaneous fat.  Blunt dissection was used to free the lesion from the surrounding tissues and the lesion was removed. Size Of Lesion (*Required): 0.7 Detail Level: Detailed Anesthesia Type: 1% lidocaine with 1:100,000 epinephrine and a 1:10 solution of 8.4% sodium bicarbonate Epidermal Sutures: 5-0 [facility] Lab: 253 Anesthesia Volume In Cc: 6 X Size Of Lesion Width In Cm (Optional): 0 Wound Dressings: a bandage Body Location Override (Optional - Billing Will Still Be Based On Selected Body Map Location If Applicable): L axillary vault 6 Mm Punch Excision Text: A 6 mm punch biopsy was used to excise the lesion to the level of the subcutaneous fat.  Blunt dissection was used to free the lesion from the surrounding tissues and the lesion was removed. 4 Mm Punch Excision Text: A 4 mm punch biopsy was used to excise the lesion to the level of the subcutaneous fat.  Blunt dissection was used to free the lesion from the surrounding tissues and the lesion was removed. Wound Care: Petrolatum 4.5 Mm Punch Excision Text: A 4.5 mm punch biopsy was used to excise the lesion to the level of the subcutaneous fat.  Blunt dissection was used to free the lesion from the surrounding tissues and the lesion was removed. Repair Type: None (Simple) Include Undermining Statement In The Repair Note?: Yes Epidermal Closure: simple interrupted 2 Mm Punch Excision Text: A 2 mm punch biopsy was used to excise the lesion to the level of the subcutaneous fat.  Blunt dissection was used to free the lesion from the surrounding tissues and the lesion was removed. Consent was obtained from the patient. The risks and benefits to therapy were discussed in detail. Specifically, the risks of infection, scarring, bleeding, prolonged wound healing, incomplete removal, allergy to anesthesia, nerve injury and recurrence were addressed. Prior to the procedure, the treatment site was clearly identified and confirmed by the patient. All components of Universal Protocol/PAUSE Rule completed. Hemostasis: Electrocautery Post-Care Instructions: I reviewed with the patient in detail post-care instructions. Patient is to keep the biopsy site dry overnight, and then apply bacitracin twice daily until healed. Patient may apply hydrogen peroxide soaks to remove any crusting. Medical Necessity Clause: This procedure was medically necessary because the lesion that was treated was: irritated Punch Size In Mm: 5 3.5 Mm Punch Excision Text: A 3.5 mm punch biopsy was used to excise the lesion to the level of the subcutaneous fat.  Blunt dissection was used to free the lesion from the surrounding tissues and the lesion was removed. Path Notes (To The Dermatopathologist): Please check margins. Suture Removal: 9 days

## 2022-07-20 ENCOUNTER — ANESTHESIA (OUTPATIENT)
Dept: ENDOSCOPY | Age: 47
End: 2022-07-20
Payer: COMMERCIAL

## 2022-07-20 ENCOUNTER — HOSPITAL ENCOUNTER (OUTPATIENT)
Age: 47
Setting detail: HOSPITAL OUTPATIENT SURGERY
Discharge: HOME | End: 2022-07-20
Attending: INTERNAL MEDICINE | Admitting: INTERNAL MEDICINE
Payer: COMMERCIAL

## 2022-07-20 ENCOUNTER — ANESTHESIA EVENT (OUTPATIENT)
Dept: ENDOSCOPY | Age: 47
End: 2022-07-20
Payer: COMMERCIAL

## 2022-07-20 VITALS
BODY MASS INDEX: 28 KG/M2 | OXYGEN SATURATION: 97 % | RESPIRATION RATE: 14 BRPM | SYSTOLIC BLOOD PRESSURE: 119 MMHG | HEART RATE: 79 BPM | DIASTOLIC BLOOD PRESSURE: 81 MMHG | WEIGHT: 200 LBS | HEIGHT: 71 IN

## 2022-07-20 DIAGNOSIS — Z12.11 COLON CANCER SCREENING: ICD-10-CM

## 2022-07-20 PROCEDURE — 45378 DIAGNOSTIC COLONOSCOPY: CPT | Performed by: INTERNAL MEDICINE

## 2022-07-20 RX ORDER — LIDOCAINE HYDROCHLORIDE 10 MG/ML
INJECTION, SOLUTION EPIDURAL; INFILTRATION; INTRACAUDAL; PERINEURAL AS NEEDED
Status: DISCONTINUED | OUTPATIENT
Start: 2022-07-20 | End: 2022-07-20 | Stop reason: SURG

## 2022-07-20 RX ORDER — SODIUM CHLORIDE, SODIUM LACTATE, POTASSIUM CHLORIDE, CALCIUM CHLORIDE 600; 310; 30; 20 MG/100ML; MG/100ML; MG/100ML; MG/100ML
INJECTION, SOLUTION INTRAVENOUS CONTINUOUS
Status: DISCONTINUED | OUTPATIENT
Start: 2022-07-20 | End: 2022-07-20

## 2022-07-20 RX ADMIN — LIDOCAINE HYDROCHLORIDE 50 MG: 10 INJECTION, SOLUTION EPIDURAL; INFILTRATION; INTRACAUDAL; PERINEURAL at 13:19:00

## 2022-07-20 RX ADMIN — SODIUM CHLORIDE, SODIUM LACTATE, POTASSIUM CHLORIDE, CALCIUM CHLORIDE: 600; 310; 30; 20 INJECTION, SOLUTION INTRAVENOUS at 13:19:00

## 2022-07-20 NOTE — ANESTHESIA POSTPROCEDURE EVALUATION
Patient: Valentino Nash    Procedure Summary     Date: 07/20/22 Room / Location: [facility] ENDOSCOPY 01 / [facility] ENDO    Anesthesia Start: 6577 Anesthesia Stop: 9676    Procedure: COLONOSCOPY (N/A ) Diagnosis:       Colon cancer screening      (hemorrhoids)    Surgeons: Pravin Conway MD Anesthesiologist:     Anesthesia Type: MAC ASA Status: 2          Anesthesia Type: MAC    Vitals Value Taken Time   /81 07/20/22 1341   Temp  07/20/22 1342   Pulse 79 07/20/22 1341   Resp 14 07/20/22 1341   SpO2 97 % 07/20/22 1341       [facility] AN Post Evaluation:   Patient Evaluated in PACU  Patient Participation: complete - patient participated  Level of Consciousness: awake and alert  Pain Management: adequate  Airway Patency:patent  Yes    Cardiovascular Status: acceptable  Respiratory Status: acceptable  Postoperative Hydration acceptable      Chiara Day MD  7/20/2022 1:42 PM

## 2022-07-20 NOTE — OPERATIVE REPORT
[facility] Endoscopy Report      Preoperative Diagnosis:  - colon cancer screening      Postoperative Diagnosis:  - small internal hemorrhoids      Procedure:    Colonoscopy         Surgeon:  Katie Bahena M.D. Anesthesia:  MAC sedation     Technique:  After informed consent, the patient was placed in the left lateral recumbent position. Digital rectal examination revealed no palpable intraluminal abnormalities. An Olympus variable stiffness 190 series HD colonoscope was inserted into the rectum and advanced under direct vision by following the lumen to the cecum. The colon was examined upon withdrawal in the left lateral position. The procedure was well tolerated without immediate complication. Findings:  The preparation of the colon was good. The terminal ileum was examined for 4 cm and visually normal.  The ileocecal valve was well preserved. The visualized colonic mucosa from the cecum to the anal verge was normal with an intact vascular pattern. Small internal hemorrhoids noted on retroflexed view. Estimated blood loss-none  Specimens-none    Impression:  - small internal hemorrhoids    Recommendations:  - Post procedure instructions given  - Repeat colonoscopy in 10 years  - Symptomatic treatment of hemorrhoids          Carmie Handler.  Rasta Mendez MD  7/20/2022  1:41 PM

## 2023-03-04 NOTE — PATIENT INSTRUCTIONS
You received an influenza vaccine today. Await results of cholesterol profile. Please try to eat healthy, exercise regularly and lose some weight.   Annual physical. Patient Needs Assistance to Leave Residence...

## 2023-04-05 RX ORDER — AMLODIPINE BESYLATE 5 MG/1
TABLET ORAL
Qty: 90 TABLET | Refills: 0 | Status: SHIPPED | OUTPATIENT
Start: 2023-04-05

## 2023-04-11 NOTE — TELEPHONE ENCOUNTER
"Weight loss   - Change your environment to support your goals - clean out your cupboards and refrigerator of most processed foods, any sugary foods or beverages and only allow yourself to indulge moderately when you are out of your normal environment. In general, eliminate sugary food and beverages, artificial sugars, processed foods  - Portion control is important - as discussed, consider taking about 1/2 of what you might usually serve yourself because your can always get more if you are actually hungry. Strive to identify when you are 70% full and stop there, wait for your body to tell you it is still hungry. This will take slowing down while you eat, eating smaller bites much slower and being more mindful in general about each meal. Avoid watching television while eating.   - Increase your points of accountability - start logging your food, for example in an ap like My Fitness Pal. This will give you an idea of actual calorie intake and nutritional content of your day. Consider getting a  or nutritionist/dietitian   - Increase your water intake at the first signs of hunger - sometimes hunger or restlessness is just your body trying to tell you it is slightly dehydrated  - Consider fasting or intermittent fasting - a great resource is \"The Complete Guide to Fasting\" by James Paez  - It is important to get > 7 hours of good quality sleep and reduce stress - consider implementing daily techniques such as yoga, meditation  - Increase your physical activity gradually - start 10 or 15 minutes at a time and increase to a goal of 60 minutes of moderate intensity activity daily. You can split this up throughout the day to help you with time   " Pt contacted, When he booked his March 20 4:30pm appt on [employer], there was already a pt at that time. I asked pt if he could come to a cancellation at 1:45pm same day but he cannot.  He is aware that there may be a wait, but to still arrive by 4:15pm.

## 2023-07-28 RX ORDER — AMLODIPINE BESYLATE 5 MG/1
5 TABLET ORAL DAILY
Qty: 30 TABLET | Refills: 0 | Status: SHIPPED | OUTPATIENT
Start: 2023-07-28

## 2023-07-28 NOTE — TELEPHONE ENCOUNTER
Refill request is for a maintenance medication and has met the criteria specified in the Ambulatory Medication Refill Standing Order for eligibility, visits, laboratory, alerts and was sent to the requested pharmacy. Requested Prescriptions     Signed Prescriptions Disp Refills    amLODIPine 5 MG Oral Tab 30 tablet 0     Sig: Take 1 tablet (5 mg total) by mouth daily.      Authorizing Provider: Andres Palomo     Ordering User: Kyra Oakley

## 2023-07-28 NOTE — TELEPHONE ENCOUNTER
Patient requesting refill for Amlodipine   He has appt scheduled with Dr Angel Mcfarlane on 8/7/23  Patient requesting refill  If only short term can be sent patient requesting 10 day supply  Tasked to [employer]

## 2023-07-30 RX ORDER — AMLODIPINE BESYLATE 5 MG/1
5 TABLET ORAL DAILY
Qty: 90 TABLET | Refills: 0 | OUTPATIENT
Start: 2023-07-30

## 2023-07-30 NOTE — TELEPHONE ENCOUNTER
Current refill request refused due to refill is either a duplicate request or has active refills at the pharmacy. Check previous templates.     Requested Prescriptions     Refused Prescriptions Disp Refills    AMLODIPINE 5 MG Oral Tab [Pharmacy Med Name: AMLODIPINE BESYLATE 5MG TABLETS] 90 tablet 0     Sig: TAKE 1 TABLET(5 MG) BY MOUTH DAILY     Refused By: Duy Marques     Reason for Refusal: Request already responded to by other means (e.g. phone or fax)

## 2023-08-06 NOTE — PATIENT INSTRUCTIONS
- You were seen in clinic for regular annual check-up. We have ordered labs for you and we will call you with the results. Please obtain the bloodwork fasting for 12 hours. OK to drink water the day of your blood draw. We have the lab downstairs on the first floor. No appointment is necessary. Lab hours are Monday-Friday 7:30 AM to 4:45 PM.  There may be Saturday hours 7 AM-11:00 AM as well. Otherwise you can obtain the blood tests on the weekends at the main hospital or local immediate care/urgent care within the [address]. For accurate testosterone levels, please obtain the blood work around 8-9 AM    Overall you remain in good health at this time. We did discuss that agent can decrease metabolism and processing speeds with regards to memory. The goal here is to rule out any secondary causes such as vitamin deficiencies, thyroid disease and try to catch them early to see if these are primary causes of your symptoms  - You have been successful with maintaining good nutrition habits, exercise  - Lets continue with your good healthy habits moving forward  - Keep a close eye on anxiety and stress as this can sometimes be an underlying cause for difficulties with our usual routines.    -Your next colonoscopy will be due 2025 with Dr. Ivory Miller  -Please continue checking your blood pressures at home and notify us if they are increasing, they remain well-controlled at this time  -Vaccines you may be due for: COVID dose #4  - Please continue to eat a varied diet including recommended servings of vegetables, fruits, and low fat dairy. Minimize high saturated fats (such as fast foods) and high sugar intake (such as soda)  - We recommend 150 minutes of moderate intensity exercise (brisk walking, swimming) weekly to maintain your current weight. Targeted weight loss will require more vigorous exercise or more than 150 minutes/week.     Return to clinic in 6-12 mo for follow-up

## 2023-08-07 ENCOUNTER — OFFICE VISIT (OUTPATIENT)
Dept: INTERNAL MEDICINE CLINIC | Facility: CLINIC | Age: 48
End: 2023-08-07

## 2023-08-07 VITALS
OXYGEN SATURATION: 98 % | HEIGHT: 71 IN | HEART RATE: 69 BPM | TEMPERATURE: 98 F | WEIGHT: 214.63 LBS | SYSTOLIC BLOOD PRESSURE: 126 MMHG | BODY MASS INDEX: 30.05 KG/M2 | DIASTOLIC BLOOD PRESSURE: 82 MMHG

## 2023-08-07 DIAGNOSIS — K40.90 LEFT INGUINAL HERNIA: ICD-10-CM

## 2023-08-07 DIAGNOSIS — R41.3 MEMORY CHANGES: ICD-10-CM

## 2023-08-07 DIAGNOSIS — K58.9 IRRITABLE BOWEL SYNDROME, UNSPECIFIED TYPE: ICD-10-CM

## 2023-08-07 DIAGNOSIS — G89.29 CHRONIC NONINTRACTABLE HEADACHE, UNSPECIFIED HEADACHE TYPE: ICD-10-CM

## 2023-08-07 DIAGNOSIS — R51.9 CHRONIC NONINTRACTABLE HEADACHE, UNSPECIFIED HEADACHE TYPE: ICD-10-CM

## 2023-08-07 DIAGNOSIS — Z13.1 SCREENING FOR DIABETES MELLITUS: ICD-10-CM

## 2023-08-07 DIAGNOSIS — R79.89 LOW TESTOSTERONE: ICD-10-CM

## 2023-08-07 DIAGNOSIS — I10 ESSENTIAL HYPERTENSION: ICD-10-CM

## 2023-08-07 DIAGNOSIS — Z00.00 ANNUAL PHYSICAL EXAM: Primary | ICD-10-CM

## 2023-08-07 DIAGNOSIS — N50.82 SCROTUM PAIN: ICD-10-CM

## 2023-08-07 DIAGNOSIS — Z13.0 SCREENING FOR DEFICIENCY ANEMIA: ICD-10-CM

## 2023-08-07 DIAGNOSIS — E78.5 DYSLIPIDEMIA: ICD-10-CM

## 2023-08-07 DIAGNOSIS — Z13.29 SCREENING FOR THYROID DISORDER: ICD-10-CM

## 2023-08-07 PROCEDURE — 3074F SYST BP LT 130 MM HG: CPT | Performed by: INTERNAL MEDICINE

## 2023-08-07 PROCEDURE — 3008F BODY MASS INDEX DOCD: CPT | Performed by: INTERNAL MEDICINE

## 2023-08-07 PROCEDURE — 3079F DIAST BP 80-89 MM HG: CPT | Performed by: INTERNAL MEDICINE

## 2023-08-07 PROCEDURE — 99396 PREV VISIT EST AGE 40-64: CPT | Performed by: INTERNAL MEDICINE

## 2023-08-07 RX ORDER — AMLODIPINE BESYLATE 5 MG/1
5 TABLET ORAL DAILY
Qty: 90 TABLET | Refills: 3 | Status: SHIPPED | OUTPATIENT
Start: 2023-08-07

## 2023-08-08 ENCOUNTER — LAB ENCOUNTER (OUTPATIENT)
Dept: LAB | Age: 48
End: 2023-08-08
Attending: INTERNAL MEDICINE
Payer: COMMERCIAL

## 2023-08-08 DIAGNOSIS — Z13.1 SCREENING FOR DIABETES MELLITUS: ICD-10-CM

## 2023-08-08 DIAGNOSIS — Z13.29 SCREENING FOR THYROID DISORDER: ICD-10-CM

## 2023-08-08 DIAGNOSIS — E78.5 DYSLIPIDEMIA: ICD-10-CM

## 2023-08-08 DIAGNOSIS — Z13.0 SCREENING FOR DEFICIENCY ANEMIA: ICD-10-CM

## 2023-08-08 DIAGNOSIS — Z00.00 ANNUAL PHYSICAL EXAM: ICD-10-CM

## 2023-08-08 DIAGNOSIS — R41.3 MEMORY CHANGES: ICD-10-CM

## 2023-08-08 DIAGNOSIS — R79.89 LOW TESTOSTERONE: ICD-10-CM

## 2023-08-08 LAB
ALBUMIN SERPL-MCNC: 3.8 G/DL (ref 3.4–5)
ALBUMIN/GLOB SERPL: 1.2 {RATIO} (ref 1–2)
ALP LIVER SERPL-CCNC: 74 U/L
ALT SERPL-CCNC: 60 U/L
ANION GAP SERPL CALC-SCNC: 8 MMOL/L (ref 0–18)
AST SERPL-CCNC: 56 U/L (ref 15–37)
BASOPHILS # BLD AUTO: 0.07 X10(3) UL (ref 0–0.2)
BASOPHILS NFR BLD AUTO: 0.9 %
BILIRUB SERPL-MCNC: 0.8 MG/DL (ref 0.1–2)
BUN BLD-MCNC: 11 MG/DL (ref 7–18)
BUN/CREAT SERPL: 12.8 (ref 10–20)
CALCIUM BLD-MCNC: 9 MG/DL (ref 8.5–10.1)
CHLORIDE SERPL-SCNC: 110 MMOL/L (ref 98–112)
CHOLEST SERPL-MCNC: 198 MG/DL (ref ?–200)
CO2 SERPL-SCNC: 23 MMOL/L (ref 21–32)
CREAT BLD-MCNC: 0.86 MG/DL
DEPRECATED RDW RBC AUTO: 41.7 FL (ref 35.1–46.3)
EGFRCR SERPLBLD CKD-EPI 2021: 107 ML/MIN/1.73M2 (ref 60–?)
EOSINOPHIL # BLD AUTO: 0.12 X10(3) UL (ref 0–0.7)
EOSINOPHIL NFR BLD AUTO: 1.5 %
ERYTHROCYTE [DISTWIDTH] IN BLOOD BY AUTOMATED COUNT: 12.7 % (ref 11–15)
FASTING PATIENT LIPID ANSWER: YES
FASTING STATUS PATIENT QL REPORTED: YES
GLOBULIN PLAS-MCNC: 3.3 G/DL (ref 2.8–4.4)
GLUCOSE BLD-MCNC: 95 MG/DL (ref 70–99)
HCT VFR BLD AUTO: 45.5 %
HDLC SERPL-MCNC: 51 MG/DL (ref 40–59)
HGB BLD-MCNC: 15.3 G/DL
IMM GRANULOCYTES # BLD AUTO: 0.02 X10(3) UL (ref 0–1)
IMM GRANULOCYTES NFR BLD: 0.2 %
LDLC SERPL CALC-MCNC: 128 MG/DL (ref ?–100)
LYMPHOCYTES # BLD AUTO: 2.46 X10(3) UL (ref 1–4)
LYMPHOCYTES NFR BLD AUTO: 30.3 %
MCH RBC QN AUTO: 29.9 PG (ref 26–34)
MCHC RBC AUTO-ENTMCNC: 33.6 G/DL (ref 31–37)
MCV RBC AUTO: 88.9 FL
MONOCYTES # BLD AUTO: 0.7 X10(3) UL (ref 0.1–1)
MONOCYTES NFR BLD AUTO: 8.6 %
NEUTROPHILS # BLD AUTO: 4.76 X10 (3) UL (ref 1.5–7.7)
NEUTROPHILS # BLD AUTO: 4.76 X10(3) UL (ref 1.5–7.7)
NEUTROPHILS NFR BLD AUTO: 58.5 %
NONHDLC SERPL-MCNC: 147 MG/DL (ref ?–130)
OSMOLALITY SERPL CALC.SUM OF ELEC: 291 MOSM/KG (ref 275–295)
PLATELET # BLD AUTO: 224 10(3)UL (ref 150–450)
POTASSIUM SERPL-SCNC: 4.1 MMOL/L (ref 3.5–5.1)
PROT SERPL-MCNC: 7.1 G/DL (ref 6.4–8.2)
RBC # BLD AUTO: 5.12 X10(6)UL
SODIUM SERPL-SCNC: 141 MMOL/L (ref 136–145)
TRIGL SERPL-MCNC: 104 MG/DL (ref 30–149)
TSI SER-ACNC: 1.58 MIU/ML (ref 0.36–3.74)
VIT B12 SERPL-MCNC: 259 PG/ML (ref 193–986)
VLDLC SERPL CALC-MCNC: 19 MG/DL (ref 0–30)
WBC # BLD AUTO: 8.1 X10(3) UL (ref 4–11)

## 2023-08-08 PROCEDURE — 80061 LIPID PANEL: CPT

## 2023-08-08 PROCEDURE — 84443 ASSAY THYROID STIM HORMONE: CPT

## 2023-08-08 PROCEDURE — 84410 TESTOSTERONE BIOAVAILABLE: CPT

## 2023-08-08 PROCEDURE — 80053 COMPREHEN METABOLIC PANEL: CPT

## 2023-08-08 PROCEDURE — 82607 VITAMIN B-12: CPT

## 2023-08-08 PROCEDURE — 36415 COLL VENOUS BLD VENIPUNCTURE: CPT

## 2023-08-08 PROCEDURE — 85025 COMPLETE CBC W/AUTO DIFF WBC: CPT

## 2023-08-10 LAB
SEX HORM BIND GLOB: 29.5 NMOL/L
TESTOST % FREE+WEAK BND: 22.9 %
TESTOST FREE+WEAK BND: 64.5 NG/DL
TESTOSTERONE TOT /MS: 281.7 NG/DL

## 2023-08-15 ENCOUNTER — TELEPHONE (OUTPATIENT)
Dept: INTERNAL MEDICINE CLINIC | Facility: CLINIC | Age: 48
End: 2023-08-15

## 2023-09-16 ENCOUNTER — HOSPITAL ENCOUNTER (OUTPATIENT)
Age: 48
Discharge: HOME | End: 2023-09-16
Payer: COMMERCIAL

## 2023-09-16 VITALS
RESPIRATION RATE: 18 BRPM | HEART RATE: 81 BPM | SYSTOLIC BLOOD PRESSURE: 137 MMHG | DIASTOLIC BLOOD PRESSURE: 93 MMHG | TEMPERATURE: 98 F | OXYGEN SATURATION: 100 %

## 2023-09-16 DIAGNOSIS — S61.002A AVULSION OF SKIN OF LEFT THUMB, INITIAL ENCOUNTER: Primary | ICD-10-CM

## 2023-09-16 PROCEDURE — 99213 OFFICE O/P EST LOW 20 MIN: CPT | Performed by: NURSE PRACTITIONER

## 2023-12-19 ENCOUNTER — HOSPITAL ENCOUNTER (OUTPATIENT)
Age: 48
Discharge: HOME | End: 2023-12-19
Payer: COMMERCIAL

## 2024-04-24 ENCOUNTER — OFFICE VISIT (OUTPATIENT)
Dept: INTERNAL MEDICINE CLINIC | Facility: CLINIC | Age: 49
End: 2024-04-24

## 2024-04-24 ENCOUNTER — LAB ENCOUNTER (OUTPATIENT)
Dept: LAB | Age: 49
End: 2024-04-24
Attending: INTERNAL MEDICINE
Payer: COMMERCIAL

## 2024-04-24 VITALS
HEART RATE: 78 BPM | HEIGHT: 71 IN | DIASTOLIC BLOOD PRESSURE: 82 MMHG | TEMPERATURE: 99 F | WEIGHT: 223.38 LBS | SYSTOLIC BLOOD PRESSURE: 128 MMHG | OXYGEN SATURATION: 98 % | BODY MASS INDEX: 31.27 KG/M2

## 2024-04-24 DIAGNOSIS — R00.2 PALPITATIONS: Primary | ICD-10-CM

## 2024-04-24 DIAGNOSIS — R00.2 PALPITATIONS: ICD-10-CM

## 2024-04-24 LAB
ANION GAP SERPL CALC-SCNC: 8 MMOL/L (ref 0–18)
ATRIAL RATE: 67 BPM
BASOPHILS # BLD AUTO: 0.1 X10(3) UL (ref 0–0.2)
BASOPHILS NFR BLD AUTO: 1.2 %
BUN BLD-MCNC: 9 MG/DL (ref 9–23)
BUN/CREAT SERPL: 10.5 (ref 10–20)
CALCIUM BLD-MCNC: 9.5 MG/DL (ref 8.7–10.4)
CHLORIDE SERPL-SCNC: 106 MMOL/L (ref 98–112)
CO2 SERPL-SCNC: 26 MMOL/L (ref 21–32)
CREAT BLD-MCNC: 0.86 MG/DL
DEPRECATED RDW RBC AUTO: 41.9 FL (ref 35.1–46.3)
EGFRCR SERPLBLD CKD-EPI 2021: 107 ML/MIN/1.73M2 (ref 60–?)
EOSINOPHIL # BLD AUTO: 0.11 X10(3) UL (ref 0–0.7)
EOSINOPHIL NFR BLD AUTO: 1.3 %
ERYTHROCYTE [DISTWIDTH] IN BLOOD BY AUTOMATED COUNT: 12.8 % (ref 11–15)
FASTING STATUS PATIENT QL REPORTED: YES
GLUCOSE BLD-MCNC: 87 MG/DL (ref 70–99)
HCT VFR BLD AUTO: 47.4 %
HGB BLD-MCNC: 15.7 G/DL
IMM GRANULOCYTES # BLD AUTO: 0.03 X10(3) UL (ref 0–1)
IMM GRANULOCYTES NFR BLD: 0.4 %
LYMPHOCYTES # BLD AUTO: 2.75 X10(3) UL (ref 1–4)
LYMPHOCYTES NFR BLD AUTO: 32.7 %
MAGNESIUM SERPL-MCNC: 1.9 MG/DL (ref 1.6–2.6)
MCH RBC QN AUTO: 29.3 PG (ref 26–34)
MCHC RBC AUTO-ENTMCNC: 33.1 G/DL (ref 31–37)
MCV RBC AUTO: 88.4 FL
MONOCYTES # BLD AUTO: 0.62 X10(3) UL (ref 0.1–1)
MONOCYTES NFR BLD AUTO: 7.4 %
NEUTROPHILS # BLD AUTO: 4.79 X10 (3) UL (ref 1.5–7.7)
NEUTROPHILS # BLD AUTO: 4.79 X10(3) UL (ref 1.5–7.7)
NEUTROPHILS NFR BLD AUTO: 57 %
OSMOLALITY SERPL CALC.SUM OF ELEC: 288 MOSM/KG (ref 275–295)
P AXIS: -6 DEGREES
P-R INTERVAL: 146 MS
PLATELET # BLD AUTO: 236 10(3)UL (ref 150–450)
POTASSIUM SERPL-SCNC: 3.8 MMOL/L (ref 3.5–5.1)
Q-T INTERVAL: 406 MS
QRS DURATION: 84 MS
QTC CALCULATION (BEZET): 429 MS
R AXIS: -9 DEGREES
RBC # BLD AUTO: 5.36 X10(6)UL
SODIUM SERPL-SCNC: 140 MMOL/L (ref 136–145)
T AXIS: -14 DEGREES
TSI SER-ACNC: 1.19 MIU/ML (ref 0.55–4.78)
VENTRICULAR RATE: 67 BPM
WBC # BLD AUTO: 8.4 X10(3) UL (ref 4–11)

## 2024-04-24 PROCEDURE — 99214 OFFICE O/P EST MOD 30 MIN: CPT | Performed by: INTERNAL MEDICINE

## 2024-04-24 PROCEDURE — 36415 COLL VENOUS BLD VENIPUNCTURE: CPT | Performed by: INTERNAL MEDICINE

## 2024-04-24 PROCEDURE — 3079F DIAST BP 80-89 MM HG: CPT | Performed by: INTERNAL MEDICINE

## 2024-04-24 PROCEDURE — 3074F SYST BP LT 130 MM HG: CPT | Performed by: INTERNAL MEDICINE

## 2024-04-24 PROCEDURE — 80048 BASIC METABOLIC PNL TOTAL CA: CPT

## 2024-04-24 PROCEDURE — 85025 COMPLETE CBC W/AUTO DIFF WBC: CPT

## 2024-04-24 PROCEDURE — 93000 ELECTROCARDIOGRAM COMPLETE: CPT | Performed by: INTERNAL MEDICINE

## 2024-04-24 PROCEDURE — 83735 ASSAY OF MAGNESIUM: CPT

## 2024-04-24 PROCEDURE — 84443 ASSAY THYROID STIM HORMONE: CPT | Performed by: INTERNAL MEDICINE

## 2024-04-24 PROCEDURE — 3008F BODY MASS INDEX DOCD: CPT | Performed by: INTERNAL MEDICINE

## 2024-04-24 RX ORDER — MULTIVITAMIN
1 TABLET ORAL DAILY
COMMUNITY

## 2024-04-24 NOTE — PROGRESS NOTES
Oliverio Perez is a 49 year old male.  Chief Complaint   Patient presents with    Palpitations     Patient c/o irregular heart beats for past 2 weeks. Denies pain.      HPI:     Pt notes intermittent sensation of fluttering in his chest for the past 2 weeks.  Occurs randomly though mainly at rest.  May last for a minute to 20 minutes.  Last night it occurred while sitting on the couch.    Previously exercised though not much for the past month or so.   Hx of heart murmur as a kid; had several echocardiograms in the past.  Gave up most caffeine a few years ago; drinks green tea in the am; no coffee or soda    Has been on antihypertensive meds since 2018.  Initially lisinopril, then amlodipine (question of lisinopril causing headaches).  Strong family hx of HTN (parents, sister).  BP's at home ~120's-130's/80's      Current Outpatient Medications   Medication Sig Dispense Refill    Multiple Vitamin (ONE-DAILY MULTI VITAMINS) Oral Tab Take 1 tablet by mouth daily.      amLODIPine 5 MG Oral Tab Take 1 tablet (5 mg total) by mouth daily. 90 tablet 3    Saccharomyces boulardii (FLORASTOR OR) Take 1 capsule by mouth daily.      Ibuprofen 200 MG Oral Cap Take 1 capsule (200 mg total) by mouth as needed.        Past Medical History:    Community acquired pneumonia    Right lung    Dyslipidemia    Low HDL    Essential hypertension    High blood pressure    IBS (irritable bowel syndrome)      Social History:  Social History     Socioeconomic History    Marital status:     Number of children: 2   Occupational History    Occupation:    Tobacco Use    Smoking status: Never     Passive exposure: Never    Smokeless tobacco: Never   Vaping Use    Vaping status: Never Used   Substance and Sexual Activity    Alcohol use: Yes     Alcohol/week: 2.0 standard drinks of alcohol     Types: 1 Glasses of wine, 1 Shots of liquor per week     Comment: Occasional glass of wine    Drug use: No   Other Topics Concern    Caffeine  Concern No    Exercise Yes    Seat Belt Yes    Special Diet No    Stress Concern Yes    Weight Concern Yes        REVIEW OF SYSTEMS:   GENERAL HEALTH: feels well otherwise  RESPIRATORY: no SOB  CARDIOVASCULAR: no chest pain/pressure  GI: no nausea, vomiting, diarrhea    Wt Readings from Last 5 Encounters:   04/24/24 223 lb 6.4 oz (101.3 kg)   08/07/23 214 lb 9.6 oz (97.3 kg)   07/13/22 200 lb (90.7 kg)   04/13/22 213 lb 12.8 oz (97 kg)   04/22/21 208 lb (94.3 kg)     Body mass index is 31.16 kg/m².      EXAM:   /82   Pulse 78   Temp 98.6 °F (37 °C)   Ht 5' 11\" (1.803 m)   Wt 223 lb 6.4 oz (101.3 kg)   SpO2 98%   BMI 31.16 kg/m²   GENERAL: well developed, well nourished, in no apparent distress  NECK: supple, no adenopathy, no bruits  LUNGS: clear to auscultation  CARDIO: RRR, normal S1S2, without murmur or gallop  GI: soft, NT, ND, NABS  EXTREMITIES: no LE edema, +2 DP pulses    ASSESSMENT AND PLAN:     Palpitations  -pt had an episode of \"heavy heartbeat\" while he was in the office -- I checked his radial pulse and noted 3 ectopic beats.  Most likely PVC's  -EKG was unremarkable - NSR, no change from EKG 10/2020  -check TSH, CBC, BMP, Mg  -check 48 hr Holter  -check Echo (hx of heart murmur as a kid; no murmur noted on exam today)      The patient indicates understanding of these issues and agrees to the plan.    Celina Sung MD, 04/24/24, 2:00 PM

## 2024-05-21 ENCOUNTER — PATIENT MESSAGE (OUTPATIENT)
Dept: ADMINISTRATIVE | Age: 49
End: 2024-05-21

## 2024-05-22 ENCOUNTER — HOSPITAL ENCOUNTER (OUTPATIENT)
Dept: CV DIAGNOSTICS | Facility: HOSPITAL | Age: 49
Discharge: HOME | End: 2024-05-22
Attending: INTERNAL MEDICINE

## 2024-05-22 DIAGNOSIS — R00.2 PALPITATIONS: ICD-10-CM

## 2024-05-22 PROCEDURE — 93306 TTE W/DOPPLER COMPLETE: CPT | Performed by: INTERNAL MEDICINE

## 2024-05-22 PROCEDURE — 93225 XTRNL ECG REC<48 HRS REC: CPT | Performed by: INTERNAL MEDICINE

## 2024-08-22 RX ORDER — AMLODIPINE BESYLATE 5 MG/1
5 TABLET ORAL DAILY
Qty: 90 TABLET | Refills: 0 | Status: SHIPPED | OUTPATIENT
Start: 2024-08-22

## 2024-08-22 NOTE — TELEPHONE ENCOUNTER
Due for visit. Mychart sent     Refill request is for a maintenance medication and has met the criteria specified in the Ambulatory Medication Refill Standing Order for eligibility, visits, laboratory, alerts and was sent to the requested pharmacy.    Requested Prescriptions     Signed Prescriptions Disp Refills    amLODIPine 5 MG Oral Tab 90 tablet 0     Sig: TAKE 1 TABLET(5 MG) BY MOUTH DAILY     Authorizing Provider: ONEIDA PEREZ     Ordering User: CARLY THOMPSON

## 2024-08-27 ENCOUNTER — HOSPITAL ENCOUNTER (OUTPATIENT)
Age: 49
Discharge: HOME | End: 2024-08-27
Payer: COMMERCIAL

## 2024-08-27 VITALS
RESPIRATION RATE: 16 BRPM | HEART RATE: 85 BPM | DIASTOLIC BLOOD PRESSURE: 95 MMHG | OXYGEN SATURATION: 99 % | SYSTOLIC BLOOD PRESSURE: 132 MMHG | TEMPERATURE: 97 F
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
## 2024-08-27 PROCEDURE — 99213 OFFICE O/P EST LOW 20 MIN: CPT

## 2024-08-27 RX ORDER — TRIAMCINOLONE ACETONIDE 1 MG/G
OINTMENT TOPICAL
Qty: 80 G | Refills: 0 | Status: SHIPPED | OUTPATIENT
Start: 2024-08-27

## 2024-08-27 NOTE — ED PROVIDER NOTES
Patient Seen in: [facility]      History     Chief Complaint   Patient presents with    Rash Skin Problem     Stated Complaint: skin problem  Subjective:   Oliverio is a 49-year-old male presenting to the immediate care complaining of bug bites.  Patient states that he had some mite bites a few weeks ago that cleared up.  States he was outside in his yard over the weekend and developed multiple mite bites over the past couple of days.  Has been using calamine lotion for the itching without much relief.  Patient denies any fever.  States he is otherwise been feeling well.  He denies any other concerns or complaints.        Objective:   Past Medical History:    Community acquired pneumonia    Right lung    Dyslipidemia    Low HDL    Essential hypertension    High blood pressure    IBS (irritable bowel syndrome)            Past Surgical History:   Procedure Laterality Date    Colonoscopy  2015    Colonoscopy N/A 7/20/2022    Procedure: COLONOSCOPY;  Surgeon: Dewey Olguin MD;  Location: [facility]              No pertinent social history.          Review of Systems    Positive for stated complaint: Rash Skin Problem    Other systems are as noted in HPI.  Constitutional and vital signs reviewed.      All other systems reviewed and negative except as noted above.    Physical Exam     ED Triage Vitals [08/27/24 1018]   BP (!) 132/95   Pulse 85   Resp 16   Temp 97.1 °F (36.2 °C)   Temp src Temporal   SpO2 99 %   O2 Device None (Room air)     Current:BP (!) 132/95   Pulse 85   Temp 97.1 °F (36.2 °C) (Temporal)   Resp 16   SpO2 99%     Physical Exam  Vitals and nursing note reviewed.   Constitutional:       General: He is not in acute distress.     Appearance: Normal appearance. He is not ill-appearing, toxic-appearing or diaphoretic.   HENT:      Head: Normocephalic.   Cardiovascular:      Rate and Rhythm: Normal rate.   Pulmonary:      Effort: Pulmonary effort is normal.   Musculoskeletal:          General: Normal range of motion.      Cervical back: Normal range of motion.   Skin:     General: Skin is warm and dry.      Capillary Refill: Capillary refill takes less than 2 seconds.      Findings: Erythema present.      Comments: Multiple scattered bug bites to neck, arms and torso with mild surrounding erythema.  No tenderness.  No drainage.  No abscess.  No signs of cellulitis.   Neurological:      General: No focal deficit present.      Mental Status: He is alert and oriented to person, place, and time.   Psychiatric:         Mood and Affect: Mood normal.         Behavior: Behavior normal.         Thought Content: Thought content normal.         Judgment: Judgment normal.         ED Course   Radiology:  No results found.  Labs Reviewed - No data to display    MDM     Medical Decision Making  Differential diagnoses reflecting the complexity of care include but are not limited to mite bites, abscess, cellulitis.    Comorbidities that add complexity to management include: None  History obtained by an independent source was from: Patient  Patient is well appearing, non-toxic and in no acute distress.  Vital signs are stable.   Patient's history and physical exam are consistent with mite bites from recent [facility] outbreak.  Rash is itchy.  No pain or warmth to the rash.  I am not concerned for cellulitis.  I sent a prescription for triamcinolone ointment to be used for the itching.  Also recommended taking a Claritin or Zyrtec every morning for itching.  Recommended Benadryl at nighttime for severe itching.  Recommended that if the patient develops any increased redness, warmth, pain or worsening symptoms you should return or go to the emergency department.  Otherwise follow-up with your primary care doctor.  ED precautions discussed.  Patient (guardian) advised to follow up with PCP in 2-3 days.  Patient (guardian) agrees with this plan of care.  Patient (guardian) verbalizes understanding of discharge  instructions and plan of care.      Risk  OTC drugs.  Prescription drug management.        Disposition and Plan     Clinical Impression:  1. Insect bite, unspecified site, initial encounter         Disposition:  Discharge  8/27/2024 10:32 am    Follow-up:  Doyle Sevilla MD  [address] [place] [id number]  [phone]          [facility] [place] [address] [facility] [address]  [facility] [id number] [phone]              Medications Prescribed:  Discharge Medication List as of 8/27/2024 10:33 AM        START taking these medications    Details   triamcinolone 0.1 % External Ointment Apply to affected area twice a day until rash resolves, Normal, Disp-80 g, R-0

## 2024-11-25 RX ORDER — AMLODIPINE BESYLATE 5 MG/1
5 TABLET ORAL DAILY
Qty: 90 TABLET | Refills: 0 | Status: SHIPPED | OUTPATIENT
Start: 2024-11-25

## 2024-11-25 NOTE — TELEPHONE ENCOUNTER
Refill request is for a maintenance medication and has met the criteria specified in the Ambulatory Medication Refill Standing Order for eligibility, visits, laboratory, alerts and was sent to the requested pharmacy.    Requested Prescriptions     Signed Prescriptions Disp Refills    AMLODIPINE 5 MG Oral Tab 90 tablet 0     Sig: TAKE 1 TABLET(5 MG) BY MOUTH DAILY     Authorizing Provider: ONEIDA PEREZ     Ordering User: TIFFANIE MAR      Patient is schedules to see  on 1/16/24.

## 2025-01-15 NOTE — PATIENT INSTRUCTIONS
- You were seen in clinic for regular annual check-up. We have ordered labs for you and we will call you with the results. Please obtain the bloodwork fasting for 10**12 hours. OK to drink water the day of your blood draw.    We have the lab downstairs on the first floor.  No appointment is necessary.  Lab hours are Monday-Friday 7:30 AM to 4:45 PM.  There may be Saturday hours 7 AM-11:00 AM as well.     Otherwise you can obtain the blood tests on the weekends at the main hospital or local immediate care/urgent care within the [facility] system.    We did review your whole body MRI screening  - Monitor for changes of sinusitis, use Flonase on as-needed basis.  Notify us if there is any flareups.  No need to consider surgery at this time if it is remaining well-controlled  - There is some fatty liver disease which can be related to aging of the liver.  We will keep a close eye on your liver numbers.  Lets continue with keeping the cholesterol numbers down  - There are degenerative changes of the spinal column including the neck, back, hips.  Continue with your good stretches and exercises.  Notify for symptoms of sciatica such as back pain, radiating pains down the legs which may need further management.  Lets see if we can reduce the risk for chronic back pain and sciatica    There were also some minor findings such as hemangioma or large blood vessel in the low back, simple liver cyst, mild hernias in the groin area.  No need to work these up further unless you develop symptoms in these respective areas.    Please send us a log after 1 week of blood pressure checks.  Take at least 1 hour after blood pressure medication administration in the morning.  May be good to check some blood pressure readings in the evening time to ensure that these are controlled.  Your blood pressure remains controlled in the clinic today    -Monitor for any episodes of palpitations.  Your cardiac workup from last year was  reassuring  - Monitor for any changes of shoulder pain, there may be some arthritis and tendinopathy of the rotator cuff.  Follow-up with Dr. Goldberg as needed  - Monitor for any changes of headaches  - You are due for colonoscopy later this year, please make appointment with Dr. Olguin Around July-August 2025  -Please continue checking your blood pressures at home and notify us if they are increasing  - Vaccines may be due for: COVID dose #4  - Please continue to eat a varied diet including recommended servings of vegetables, fruits, and low fat dairy. Minimize high saturated fats (such as fast foods) and high sugar intake (such as soda)  - We recommend 150 minutes of moderate intensity exercise (brisk walking, swimming) weekly to maintain your current weight.  Targeted weight loss will require more vigorous exercise or more than 150 minutes/week.    Return to clinic in 1 year for annual physical examination

## 2025-01-15 NOTE — H&P
Oliverio Perez is a 49 year old male.    HPI:     Chief Complaint   Patient presents with    Physical     Pt here for annual physical exam. Reviewed Preventative/Wellness form with patient.     Mr. Perez is a 49 year old male PMHx hypertension, hyperlipidemia coming in for annual physical exam    Did the Prenuvo scan. No pain symptoms.     Had palpitations last year, rarely feels the PVCs. He doesn't take in caffeine. He was worried at that time. His blood pressure. No specific trigger.    He had bilateral shoulder injury from falling off the ladder. Completed PT x 6 weeks.  Fully recovered.     Work-life balance is good.     No issues with anxiety nor depression    135/90s  with home readings at this time.     I did review the patient's past medical history, past surgical history, social history, family history and updated as below follow-up    SocHX  - Home: wife and 2 kids; safe at home  - Work:  - working from home; hybrid.   - Hobbies: video games - Party pack  x10, internally  - Nutrition: relatively health diet - breakfast hit or miss - banana/cereal/eggs, lunch - sandwich, fruits. Eating out sometimes. Dinner - empty nesters, veggies.   - Physical Activity: Running, home. He does walk a lot.     HISTORY:  Past Medical History:    Community acquired pneumonia    Right lung    Dyslipidemia    Low HDL    Essential hypertension    High blood pressure    IBS (irritable bowel syndrome)      Past Surgical History:   Procedure Laterality Date    Colonoscopy      Colonoscopy N/A 2022    Procedure: COLONOSCOPY;  Surgeon: Dewey Olguin MD;  [facility]: [facility]      Family History   Problem Relation Age of Onset    Hypertension Father     Hypertension Mother     Crohn's Disease Paternal Grandmother     Diabetes Paternal Grandfather     Hypertension Sister     Prostate Cancer Paternal Uncle     Diabetes Paternal Uncle     Heart Disease Maternal Grandfather          CHF age 65     Colon Cancer Paternal Aunt       Social History:   Social History     Socioeconomic History    Marital status:     Number of children: 2   Occupational History    Occupation:    Tobacco Use    Smoking status: Never     Passive exposure: Never    Smokeless tobacco: Never   Vaping Use    Vaping status: Never Used   Substance and Sexual Activity    Alcohol use: Yes     Alcohol/week: 2.0 standard drinks of alcohol     Types: 1 Glasses of wine, 1 Shots of liquor per week     Comment: Occasional glass of wine    Drug use: No   Other Topics Concern    Caffeine Concern No    Exercise Yes    Seat Belt Yes    Special Diet No    Stress Concern Yes    Weight Concern Yes        Medications (Active prior to today's visit):  Current Outpatient Medications   Medication Sig Dispense Refill    amLODIPine 5 MG Oral Tab Take 1 tablet (5 mg total) by mouth daily. 90 tablet 3    Multiple Vitamin (ONE-DAILY MULTI VITAMINS) Oral Tab Take 1 tablet by mouth daily.      Saccharomyces boulardii (FLORASTOR OR) Take 1 capsule by mouth daily.      Ibuprofen 200 MG Oral Cap Take 1 capsule (200 mg total) by mouth as needed.         Allergies:  Allergies   Allergen Reactions    Seasonal Runny nose         ROS:   Positive Findings indicated in BOLD    Constitutional: Fever, Chills, Weight Gain, Weight Loss, Night Sweats, Fatigue, Malaise  ENT/Mouth:  Hearing Changes, Ear Pain, Nasal Congestion, Sinus Pain, Hoarseness, Sore throat, Rhinorrhea, Swallowing Difficulty  Eyes: Eye Pain, Swelling, Redness, Foreign Body, Discharge, Vision Changes  Cardiovascular: Chest Pain, SOB, PND, Dyspnea on Exertion, Orthopnea, Claudication, Edema, Palpitations  Respiratory: Cough, Sputum, Wheezing, Shortness of breath  Gastrointestinal: Nausea, Vomiting, Diarrhea, Constipation, Pain, Heartburn, Dysphagia, Bloody stools, Tarry stools  Genitourinary: Dysmenorrhea, Dysuria, Urinary Frequency, Hematuria, Urinary Incontinence, Urgency,  Flank Pain, L scrotal  discomfort  Musculoskeletal: Arthralgias, Myalgias, Joint Swelling, Joint Stiffness, Back Pain, Neck Pain  Integumentary: Skin Lesions, Pruritis, Hair Changes, Jaundice, Nail changes  Neuro: Weakness, Numbness, Paresthesias, Loss of Consciousness, Syncope, Dizziness, Headache, Falls  Psych: Anxiety, Depression, Insomnia, Suicidal Ideation, Homicidal ideation, Memory Changes  Heme/Lymph: Bruising, Bleeding, Lymphadenopathy  Endocrine: Polyuria, Polydipsia, Temperature Intolerance    PHYSICAL EXAM:   Vital Signs:  Blood pressure 124/84, pulse 91, temperature 98 °F (36.7 °C), height 5' 11\" (1.803 m), weight 217 lb (98.4 kg), SpO2 98%.     Constitutional: No acute distress. Alert and oriented x 3.  Eyes: EOMI, PERRLA, clear sclera b/l  HENT: NCAT, Moist mucous membranes, Oropharynx without erythema or exudates;  Neck: No JVD, no thyromegaly  Cardiovascular: S1, S2, no S3, no S4, Regular rate and rhythm, No murmurs/gallops/rubs.   Vascular: Equal pulses 2+ carotids without bruits or thrills, radial, PT/DP  Respiratory: Clear to auscultation bilaterally.  No wheezes/rales/rhonchi  Gastrointestinal: Soft, nontender, nondistended. Positive bowel sounds x 4. No rebound tenderness. No hepatomegaly, No splenomegaly  Genitourinary: No CVA tenderness bilaterally; normal testicular exam  Neurologic: No focal neurological deficits, CN II-XII intact, light touch intact, MSK Strength 5/5 and symmetric in all extremities, normal gait/tandem/tiptoe/heels, finger-to-nose and heel shin within normal limits.  Rapid alternating movements and fine motor movements intact bilaterally  Musculoskeletal: Full range of motion of all extremities, no clubbing/swelling/edema  Skin: No lesions, No erythema, no jaundice, Cap Refill < 2s  Psychiatric: Appropriate mood and affect  Heme/Lymph/Immune: No cervical  LAD      DATA REVIEWED   Labs:  No results found for this or any previous visit (from the past 8760 hours).      Recent Results (from the past  8760 hours)   Basic Metabolic Panel (8)    Collection Time: 04/24/24  2:44 PM   Result Value Ref Range    Glucose 87 70 - 99 mg/dL    Sodium 140 136 - 145 mmol/L    Potassium 3.8 3.5 - 5.1 mmol/L    Chloride 106 98 - 112 mmol/L    CO2 26.0 21.0 - 32.0 mmol/L    Anion Gap 8 0 - 18 mmol/L    BUN 9 9 - 23 mg/dL    Creatinine 0.86 0.70 - 1.30 mg/dL    BUN/CREA Ratio 10.5 10.0 - 20.0    Calcium, Total 9.5 8.7 - 10.4 mg/dL    Calculated Osmolality 288 275 - 295 mOsm/kg    eGFR-Cr 107 >=60 mL/min/1.73m2    Patient Fasting for BMP? Yes      *Note: Due to a large number of results and/or encounters for the requested time period, some results have not been displayed. A complete set of results can be found in Results Review.             I reviewed the most recent set of blood work from 1/26/2021  -CMP, CRP, TSH, CBC unremarkable    MRI brain 2/3/2021  Impression   CONCLUSION:   1. Mild residual maxillary sinusitis, left greater than right, improved from 08/23/2019.   2. Otherwise unremarkable noncontrast brain MRI.      MRV brain 2/3/2021  Impression   CONCLUSION:   Unremarkable pre/post contrast MR venogram of the head.      CT sinus 4/9/2021     Impression   CONCLUSION: Mild chronic inflammation within the maxillary sinuses, otherwise improved compared to prior imaging from 8/23/19.  No evidence of acute paranasal sinus inflammation.      Ultrasound groin and scrotum 4/2022  Impression   CONCLUSION:  1. Small bilateral hydrocele.  2. Small left varicocele.  3. Mild diffuse thickening of the scrotal skin.     Impression   CONCLUSION:     Small bilateral inguinal hernias containing pelvic fat.  No bowel herniation.     No lymphadenopathy, mass or loculated collection within either inguinal region.          X-ray shoulders 9/9/2024    Impression    Osteoarthritis of the right shoulder with early manifestation of rotator cuff tendinopathy     Impression    Mild to moderate degree of osteoarthritis left shoulder joint with early  manifestation of rotator cuff tendinopathy     2D echo 5/22/2024  Conclusions:   Left ventricle: The cavity size was normal. Wall thickness was normal.   Systolic function was normal. The estimated ejection fraction was 60-65%, by   visual assessment. No diagnostic evidence for regional wall motion   abnormalities. Left ventricular diastolic function parameters were normal.     Impressions:  No previous study was available for comparison.     Holter monitor 5/22/2024    Holter Impressions     - Rhythm is sinus with a mean rate of 81bpm. Range is  bpm.   - Atrial ectopy present less than 0.1%.   - No sustained atrial arrhythmia.   - There are minimal PVCs (0.6%).   - No sustained ventricular arrhythmia.   - No symptoms or events reported.       Colonoscopy 7/2022  FINAL DIAGNOSIS                         A. Jejunum; biopsy:     *  Fragments of small intestinal metaplasia with preserved villous   architecture.     *  No evidence of villous attenuation, increased intraepithelial lymphocytes,   epithelial dysplasia or carcinoma identified.       B. Right colon; biopsy:     *  Colonic mucosa with preserved glandular architecture.     *  No evidence of acute inflammation, microscopic or collagenous colitis,   epithelial dysplasia or carcinoma identified.       C. Left colon; biopsy:     *   Colonic mucosa with preserved glandular architecture.     *  No evidence of acute inflammation, microscopic or collagenous colitis,   epithelial dysplasia or carcinoma identified.     ASSESSMENT/PLAN:     Shoulder pain  Noted with osteoarthritis, mild to moderate left shoulder early rotator cuff tendinopathy of the right shoulder  - Seen by Dr. Goldberg orthopedics 9/9/2024, recommended physical therapy completed end of last year  - This has resolved    Palpitations  - Underwent cardiac workup 5/22/2024  - 2D echo with EF 60-65%, otherwise normal echo  - Holter monitor minimal PVCs, no sustained ventricular arrhythmia.  No events  or symptoms noted  -Remains otherwise asymptomatic    Changes in memory  Suspected age-related changes in memory without significant impact of his usual activities  -Highly functional baseline with work, family obligations, and independent living.  - We should at least check in addition to fasting blood work, vitamin B12 levels  - We will manage conservatively for now    Left scrotal discomfort, improved  Palpable left greater than right bulge.  Genitourinary exam otherwise unremarkable  -Clinical suspicion for hernia progression.  Did have an MRI of the pelvis in 2015 which showed tiny left greater than right hernia at that time.  Is asymptomatic, we should confirm with ultrasound of the bilateral groin areas  - Ultrasound reviewed as above, small bilateral hydroceles, small left varicocele.  Small bilateral inguinal hernias with pelvic fat  - Seems to have been stable since last visit  - This was demonstrated on the MRI    Headaches, improved  Daily, intractable headaches that worsened at nighttime.  Remains Neurologically intact on exam.  We performed a full secondary work-up including blood work, MRI/MRV which was unremarkable for neurological findings.  Did show chronic sinusitis.  -Following with Dr. Eastman of ENT, possible sinus surgery with ethmoidectomy per last visit 4/14/2021  -Also saw Dr. Cody of neurology, 4/22/2021.  Likely multifactorial with tension headache, chronic sinusitis.  Recommended magnesium and riboflavin.  Headache schedule recommended  -Thankfully his symptoms have abated  - Whole-body MRI we reviewed together in clinic, mucosal thickening in the sinuses.  As his headaches have improved, we are managing this conservatively with Flonase on as-needed basis    Hypertension  Blood pressure overall improved on 2 checks today  Home medications: Amlodipine 5 mg daily  -Check blood pressure log at home.  He will send us a Nuji message with updated log in 1 week  -Continue amlodipine 5 mg  daily    Dyslipidemia  Repeat fasting lipid panel  Last lipid panel,       Irritable bowel syndrome  Stable with monitoring of dietary intake    Degenerative changes of the spinal column  Multiple levels of the cervical, thoracic, lumbar spine.  He does have some mild scoliosis  - Overall asymptomatic, sometimes the back can stiffen up  - He s using a  to stretch out the areas  - Will continue with conservative measures in the meantime         Orders This Visit:  Orders Placed This Encounter   Procedures    CBC With Differential With Platelet    Comp Metabolic Panel (14)    Lipid Panel    TSH W Reflex To Free T4       Meds This Visit:  Requested Prescriptions     Signed Prescriptions Disp Refills    amLODIPine 5 MG Oral Tab 90 tablet 3     Sig: Take 1 tablet (5 mg total) by mouth daily.       Imaging & Referrals:  [facility] - INTERNAL       Health Maintenance  HTN Screen: BP at goal  DM Screen: Check fasting sugar  HLD Screen: Repeat fasting lipid panel  HCV Screen: Considered low risk  HIV Screen: considered low risk  G/C/Syphilis: Considered low risk    Colon Cancer Screening (50-70): Last colonoscopy 2022, next due 2025 with Dr. Olguin.  Referral provided  Prostate Cancer Screening: (50-70): Not indicated  Lung Cancer Screening (55-79 with 30 p/year and active < 15 years): Not indicated  AAA Screening (65-75 Hx of smoking): Not indicated    Influenza: Annually   Td/Tdap: Last Tdap: 2017, due 2027  Zoster (60+): Not indicated  HPV (19-26): Not indicated  Pneumococcal: Not indicated    Immunization History   Administered Date(s) Administered    Covid-19 Vaccine Pfizer 30 mcg/0.3 ml 04/08/2021, 04/29/2021, 11/17/2021    FLULAVAL 6 months & older 0.5 ml Prefilled syringe (59232) 09/20/2019, 10/02/2020    FLUZONE 6 months and older PFS 0.5 ml (21071) 09/20/2019, 10/02/2020, 10/20/2021, 12/11/2022    Influenza 12/12/2022    TDAP 01/06/2017    Varicella 10/26/2006     Doyle Sevilla MD, 01/16/25, 3:04 PM

## 2025-01-16 ENCOUNTER — LAB ENCOUNTER (OUTPATIENT)
Dept: LAB | Age: 50
End: 2025-01-16
Attending: INTERNAL MEDICINE
Payer: COMMERCIAL

## 2025-01-16 ENCOUNTER — PATIENT MESSAGE (OUTPATIENT)
Dept: INTERNAL MEDICINE CLINIC | Facility: CLINIC | Age: 50
End: 2025-01-16

## 2025-01-16 ENCOUNTER — OFFICE VISIT (OUTPATIENT)
Dept: INTERNAL MEDICINE CLINIC | Facility: CLINIC | Age: 50
End: 2025-01-16
Payer: COMMERCIAL

## 2025-01-16 VITALS
HEART RATE: 91 BPM | DIASTOLIC BLOOD PRESSURE: 84 MMHG | WEIGHT: 217 LBS | BODY MASS INDEX: 30.38 KG/M2 | SYSTOLIC BLOOD PRESSURE: 124 MMHG | OXYGEN SATURATION: 98 % | TEMPERATURE: 98 F | HEIGHT: 71 IN

## 2025-01-16 DIAGNOSIS — E78.5 DYSLIPIDEMIA: ICD-10-CM

## 2025-01-16 DIAGNOSIS — I10 ESSENTIAL HYPERTENSION: ICD-10-CM

## 2025-01-16 DIAGNOSIS — Z13.29 SCREENING FOR THYROID DISORDER: ICD-10-CM

## 2025-01-16 DIAGNOSIS — K58.9 IRRITABLE BOWEL SYNDROME, UNSPECIFIED TYPE: ICD-10-CM

## 2025-01-16 DIAGNOSIS — Z13.0 SCREENING FOR DEFICIENCY ANEMIA: ICD-10-CM

## 2025-01-16 DIAGNOSIS — R00.2 PALPITATIONS: ICD-10-CM

## 2025-01-16 DIAGNOSIS — Z00.00 ANNUAL PHYSICAL EXAM: Primary | ICD-10-CM

## 2025-01-16 DIAGNOSIS — Z12.11 SCREENING FOR COLON CANCER: ICD-10-CM

## 2025-01-16 DIAGNOSIS — Z13.1 SCREENING FOR DIABETES MELLITUS: ICD-10-CM

## 2025-01-16 DIAGNOSIS — Z00.00 ANNUAL PHYSICAL EXAM: ICD-10-CM

## 2025-01-16 LAB
ALBUMIN SERPL-MCNC: 4.9 G/DL (ref 3.2–4.8)
ALBUMIN/GLOB SERPL: 1.8 {RATIO} (ref 1–2)
ALP LIVER SERPL-CCNC: 78 U/L
ALT SERPL-CCNC: 35 U/L
ANION GAP SERPL CALC-SCNC: 10 MMOL/L (ref 0–18)
AST SERPL-CCNC: 25 U/L (ref ?–34)
BASOPHILS # BLD AUTO: 0.08 X10(3) UL (ref 0–0.2)
BASOPHILS NFR BLD AUTO: 0.8 %
BILIRUB SERPL-MCNC: 1.1 MG/DL (ref 0.3–1.2)
BUN BLD-MCNC: 9 MG/DL (ref 9–23)
BUN/CREAT SERPL: 10.2 (ref 10–20)
CALCIUM BLD-MCNC: 9.9 MG/DL (ref 8.7–10.4)
CHLORIDE SERPL-SCNC: 103 MMOL/L (ref 98–112)
CHOLEST SERPL-MCNC: 213 MG/DL (ref ?–200)
CO2 SERPL-SCNC: 29 MMOL/L (ref 21–32)
CREAT BLD-MCNC: 0.88 MG/DL
DEPRECATED RDW RBC AUTO: 42.1 FL (ref 35.1–46.3)
EGFRCR SERPLBLD CKD-EPI 2021: 106 ML/MIN/1.73M2 (ref 60–?)
EOSINOPHIL # BLD AUTO: 0.11 X10(3) UL (ref 0–0.7)
EOSINOPHIL NFR BLD AUTO: 1 %
ERYTHROCYTE [DISTWIDTH] IN BLOOD BY AUTOMATED COUNT: 12.8 % (ref 11–15)
FASTING PATIENT LIPID ANSWER: YES
FASTING STATUS PATIENT QL REPORTED: YES
GLOBULIN PLAS-MCNC: 2.7 G/DL (ref 2–3.5)
GLUCOSE BLD-MCNC: 86 MG/DL (ref 70–99)
HCT VFR BLD AUTO: 46.3 %
HDLC SERPL-MCNC: 40 MG/DL (ref 40–59)
HGB BLD-MCNC: 15.3 G/DL
IMM GRANULOCYTES # BLD AUTO: 0.03 X10(3) UL (ref 0–1)
IMM GRANULOCYTES NFR BLD: 0.3 %
LDLC SERPL CALC-MCNC: 144 MG/DL (ref ?–100)
LYMPHOCYTES # BLD AUTO: 2.93 X10(3) UL (ref 1–4)
LYMPHOCYTES NFR BLD AUTO: 27.5 %
MCH RBC QN AUTO: 29.3 PG (ref 26–34)
MCHC RBC AUTO-ENTMCNC: 33 G/DL (ref 31–37)
MCV RBC AUTO: 88.5 FL
MONOCYTES # BLD AUTO: 0.7 X10(3) UL (ref 0.1–1)
MONOCYTES NFR BLD AUTO: 6.6 %
NEUTROPHILS # BLD AUTO: 6.79 X10 (3) UL (ref 1.5–7.7)
NEUTROPHILS # BLD AUTO: 6.79 X10(3) UL (ref 1.5–7.7)
NEUTROPHILS NFR BLD AUTO: 63.8 %
NONHDLC SERPL-MCNC: 173 MG/DL (ref ?–130)
OSMOLALITY SERPL CALC.SUM OF ELEC: 292 MOSM/KG (ref 275–295)
PLATELET # BLD AUTO: 281 10(3)UL (ref 150–450)
POTASSIUM SERPL-SCNC: 4 MMOL/L (ref 3.5–5.1)
PROT SERPL-MCNC: 7.6 G/DL (ref 5.7–8.2)
RBC # BLD AUTO: 5.23 X10(6)UL
SODIUM SERPL-SCNC: 142 MMOL/L (ref 136–145)
TRIGL SERPL-MCNC: 160 MG/DL (ref 30–149)
TSI SER-ACNC: 1.29 UIU/ML (ref 0.55–4.78)
VLDLC SERPL CALC-MCNC: 30 MG/DL (ref 0–30)
WBC # BLD AUTO: 10.6 X10(3) UL (ref 4–11)

## 2025-01-16 PROCEDURE — 84443 ASSAY THYROID STIM HORMONE: CPT

## 2025-01-16 PROCEDURE — 85025 COMPLETE CBC W/AUTO DIFF WBC: CPT

## 2025-01-16 PROCEDURE — 80061 LIPID PANEL: CPT

## 2025-01-16 PROCEDURE — 36415 COLL VENOUS BLD VENIPUNCTURE: CPT

## 2025-01-16 PROCEDURE — 80053 COMPREHEN METABOLIC PANEL: CPT

## 2025-01-16 RX ORDER — AMLODIPINE BESYLATE 5 MG/1
5 TABLET ORAL DAILY
Qty: 90 TABLET | Refills: 3 | Status: SHIPPED | OUTPATIENT
Start: 2025-01-16

## 2025-01-17 ENCOUNTER — TELEPHONE (OUTPATIENT)
Dept: INTERNAL MEDICINE CLINIC | Facility: CLINIC | Age: 50
End: 2025-01-17

## 2025-01-17 NOTE — TELEPHONE ENCOUNTER
Please notify patient that I reviewed the blood work from yesterday    Cholesterol levels bumped up from the last check.  But no immediate medication needed, we can try to bring this down with optimizing nutrition, exercise.  Best course of action would be Opting for high fiber, lean proteins more so chicken, fish lightly boiled.  Can consider Mediterranean diet as a guideline for cholesterol management.  Lets keep up the good exercises he has been doing at home

## (undated) DIAGNOSIS — K40.90 LEFT INGUINAL HERNIA: Primary | ICD-10-CM

## (undated) DIAGNOSIS — Z12.11 COLON CANCER SCREENING: Primary | ICD-10-CM

## (undated) DEVICE — LINE MNTR ADLT SET O2 INTMD

## (undated) DEVICE — MEDI-VAC NON-CONDUCTIVE SUCTION TUBING 6MM X 1.8M (6FT.) L: Brand: CARDINAL HEALTH

## (undated) DEVICE — KIT ENDO ORCAPOD 160/180/190

## (undated) DEVICE — KIT CLEAN ENDOKIT 1.1OZ GOWNX2

## (undated) NOTE — LETTER
[facility], [address] [facility] [facility], [place]  [address] [place] [id number]  PH: [phone]  FAX: [phone]            To whom it may concern,      Mr. Sushil Prater is currently under my care and has given me permission to prov

## (undated) NOTE — MR AVS SNAPSHOT
[facility]  [address] [place] [id number] [phone]               Thank you for choosing us for your health care visit with Yary Dye. Manuel Parra MD.  We are glad to serve you and happy to provide you with this summary of your visit. [employer] will allow you to access patient instructions from your recent visit,  view other health information, and more. To sign up or find more information, go to [email or web address] and click on the Sign Up Now link in the [employer] box.      Enter Don’t eat while when you’re bored.      EAT THESE FOODS MORE OFTEN: EAT THESE FOODS LESS OFTEN:   Make half your plate fruits and vegetables Highly refined, white starches including white bread, rice and pasta   Eat plenty of protein, keep the fat content l

## (undated) NOTE — LETTER
[facility], [address] [facility], [place]  [address] [place] [address] [place] [id number]  PH: [phone]  FAX: [phone]    To whom it may concern,      Mr. Ines Frank is currently under my care and has given me permission to provide this